# Patient Record
Sex: MALE | Race: WHITE | NOT HISPANIC OR LATINO | Employment: FULL TIME | URBAN - METROPOLITAN AREA
[De-identification: names, ages, dates, MRNs, and addresses within clinical notes are randomized per-mention and may not be internally consistent; named-entity substitution may affect disease eponyms.]

---

## 2020-02-27 ENCOUNTER — APPOINTMENT (OUTPATIENT)
Dept: CT IMAGING | Facility: HOSPITAL | Age: 45
End: 2020-02-27

## 2020-02-27 ENCOUNTER — HOSPITAL ENCOUNTER (INPATIENT)
Facility: HOSPITAL | Age: 45
LOS: 2 days | Discharge: HOME | End: 2020-02-29
Attending: EMERGENCY MEDICINE | Admitting: INTERNAL MEDICINE

## 2020-02-27 ENCOUNTER — APPOINTMENT (OUTPATIENT)
Dept: GENERAL RADIOLOGY | Facility: HOSPITAL | Age: 45
End: 2020-02-27

## 2020-02-27 DIAGNOSIS — J20.4 PARAINFLUENZA VIRUS BRONCHITIS: ICD-10-CM

## 2020-02-27 DIAGNOSIS — J96.01 ACUTE RESPIRATORY FAILURE WITH HYPOXIA (HCC): Primary | ICD-10-CM

## 2020-02-27 DIAGNOSIS — J47.1 BRONCHIECTASIS WITH ACUTE EXACERBATION (HCC): ICD-10-CM

## 2020-02-27 LAB
ALBUMIN SERPL-MCNC: 4.3 G/DL (ref 3.5–5.2)
ALBUMIN/GLOB SERPL: 2 G/DL
ALP SERPL-CCNC: 93 U/L (ref 39–117)
ALT SERPL W P-5'-P-CCNC: 29 U/L (ref 1–41)
ANION GAP SERPL CALCULATED.3IONS-SCNC: 14.4 MMOL/L (ref 5–15)
AST SERPL-CCNC: 26 U/L (ref 1–40)
B PARAPERT DNA SPEC QL NAA+PROBE: NOT DETECTED
B PERT DNA SPEC QL NAA+PROBE: NOT DETECTED
BASOPHILS # BLD AUTO: 0.07 10*3/MM3 (ref 0–0.2)
BASOPHILS NFR BLD AUTO: 0.4 % (ref 0–1.5)
BILIRUB SERPL-MCNC: 0.3 MG/DL (ref 0.2–1.2)
BUN BLD-MCNC: 18 MG/DL (ref 6–20)
BUN/CREAT SERPL: 18.6 (ref 7–25)
C PNEUM DNA NPH QL NAA+NON-PROBE: NOT DETECTED
CALCIUM SPEC-SCNC: 9.2 MG/DL (ref 8.6–10.5)
CHLORIDE SERPL-SCNC: 99 MMOL/L (ref 98–107)
CO2 SERPL-SCNC: 25.6 MMOL/L (ref 22–29)
CREAT BLD-MCNC: 0.97 MG/DL (ref 0.76–1.27)
DEPRECATED RDW RBC AUTO: 40.5 FL (ref 37–54)
EOSINOPHIL # BLD AUTO: 0.34 10*3/MM3 (ref 0–0.4)
EOSINOPHIL NFR BLD AUTO: 1.9 % (ref 0.3–6.2)
ERYTHROCYTE [DISTWIDTH] IN BLOOD BY AUTOMATED COUNT: 13.1 % (ref 12.3–15.4)
FLUAV H1 2009 PAND RNA NPH QL NAA+PROBE: NOT DETECTED
FLUAV H1 HA GENE NPH QL NAA+PROBE: NOT DETECTED
FLUAV H3 RNA NPH QL NAA+PROBE: NOT DETECTED
FLUAV SUBTYP SPEC NAA+PROBE: NOT DETECTED
FLUBV RNA ISLT QL NAA+PROBE: NOT DETECTED
GFR SERPL CREATININE-BSD FRML MDRD: 84 ML/MIN/1.73
GLOBULIN UR ELPH-MCNC: 2.2 GM/DL
GLUCOSE BLD-MCNC: 118 MG/DL (ref 65–99)
HADV DNA SPEC NAA+PROBE: NOT DETECTED
HCOV 229E RNA SPEC QL NAA+PROBE: NOT DETECTED
HCOV HKU1 RNA SPEC QL NAA+PROBE: NOT DETECTED
HCOV NL63 RNA SPEC QL NAA+PROBE: NOT DETECTED
HCOV OC43 RNA SPEC QL NAA+PROBE: NOT DETECTED
HCT VFR BLD AUTO: 49.2 % (ref 37.5–51)
HGB BLD-MCNC: 17 G/DL (ref 13–17.7)
HMPV RNA NPH QL NAA+NON-PROBE: DETECTED
HPIV1 RNA SPEC QL NAA+PROBE: NOT DETECTED
HPIV2 RNA SPEC QL NAA+PROBE: NOT DETECTED
HPIV3 RNA NPH QL NAA+PROBE: NOT DETECTED
HPIV4 P GENE NPH QL NAA+PROBE: DETECTED
IMM GRANULOCYTES # BLD AUTO: 0.2 10*3/MM3 (ref 0–0.05)
IMM GRANULOCYTES NFR BLD AUTO: 1.1 % (ref 0–0.5)
LYMPHOCYTES # BLD AUTO: 2.59 10*3/MM3 (ref 0.7–3.1)
LYMPHOCYTES NFR BLD AUTO: 14.4 % (ref 19.6–45.3)
M PNEUMO IGG SER IA-ACNC: NOT DETECTED
MCH RBC QN AUTO: 29.8 PG (ref 26.6–33)
MCHC RBC AUTO-ENTMCNC: 34.6 G/DL (ref 31.5–35.7)
MCV RBC AUTO: 86.3 FL (ref 79–97)
MONOCYTES # BLD AUTO: 0.9 10*3/MM3 (ref 0.1–0.9)
MONOCYTES NFR BLD AUTO: 5 % (ref 5–12)
NEUTROPHILS # BLD AUTO: 13.84 10*3/MM3 (ref 1.7–7)
NEUTROPHILS NFR BLD AUTO: 77.2 % (ref 42.7–76)
NRBC BLD AUTO-RTO: 0 /100 WBC (ref 0–0.2)
NT-PROBNP SERPL-MCNC: 11.1 PG/ML (ref 5–450)
PLATELET # BLD AUTO: 310 10*3/MM3 (ref 140–450)
PMV BLD AUTO: 9 FL (ref 6–12)
POTASSIUM BLD-SCNC: 3.7 MMOL/L (ref 3.5–5.2)
PROT SERPL-MCNC: 6.5 G/DL (ref 6–8.5)
RBC # BLD AUTO: 5.7 10*6/MM3 (ref 4.14–5.8)
RHINOVIRUS RNA SPEC NAA+PROBE: NOT DETECTED
RSV RNA NPH QL NAA+NON-PROBE: NOT DETECTED
SODIUM BLD-SCNC: 139 MMOL/L (ref 136–145)
TROPONIN T SERPL-MCNC: <0.01 NG/ML (ref 0–0.03)
WBC NRBC COR # BLD: 17.94 10*3/MM3 (ref 3.4–10.8)

## 2020-02-27 PROCEDURE — 94799 UNLISTED PULMONARY SVC/PX: CPT

## 2020-02-27 PROCEDURE — 84484 ASSAY OF TROPONIN QUANT: CPT | Performed by: EMERGENCY MEDICINE

## 2020-02-27 PROCEDURE — 83880 ASSAY OF NATRIURETIC PEPTIDE: CPT | Performed by: EMERGENCY MEDICINE

## 2020-02-27 PROCEDURE — 0100U HC BIOFIRE FILMARRAY RESP PANEL 2: CPT | Performed by: EMERGENCY MEDICINE

## 2020-02-27 PROCEDURE — 99285 EMERGENCY DEPT VISIT HI MDM: CPT

## 2020-02-27 PROCEDURE — 25010000002 METHYLPREDNISOLONE PER 125 MG: Performed by: EMERGENCY MEDICINE

## 2020-02-27 PROCEDURE — 99284 EMERGENCY DEPT VISIT MOD MDM: CPT

## 2020-02-27 PROCEDURE — 71046 X-RAY EXAM CHEST 2 VIEWS: CPT

## 2020-02-27 PROCEDURE — 85025 COMPLETE CBC W/AUTO DIFF WBC: CPT | Performed by: EMERGENCY MEDICINE

## 2020-02-27 PROCEDURE — 71275 CT ANGIOGRAPHY CHEST: CPT

## 2020-02-27 PROCEDURE — 80053 COMPREHEN METABOLIC PANEL: CPT | Performed by: EMERGENCY MEDICINE

## 2020-02-27 PROCEDURE — 94640 AIRWAY INHALATION TREATMENT: CPT

## 2020-02-27 PROCEDURE — 93010 ELECTROCARDIOGRAM REPORT: CPT | Performed by: INTERNAL MEDICINE

## 2020-02-27 PROCEDURE — 25010000002 IOPAMIDOL 61 % SOLUTION: Performed by: EMERGENCY MEDICINE

## 2020-02-27 PROCEDURE — 93005 ELECTROCARDIOGRAM TRACING: CPT | Performed by: EMERGENCY MEDICINE

## 2020-02-27 RX ORDER — FEXOFENADINE HCL AND PSEUDOEPHEDRINE HCI 180; 240 MG/1; MG/1
1 TABLET, EXTENDED RELEASE ORAL NIGHTLY
COMMUNITY
End: 2022-10-08 | Stop reason: HOSPADM

## 2020-02-27 RX ORDER — CETIRIZINE HYDROCHLORIDE 5 MG/1
5 TABLET ORAL DAILY
COMMUNITY

## 2020-02-27 RX ORDER — SODIUM CHLORIDE 0.9 % (FLUSH) 0.9 %
10 SYRINGE (ML) INJECTION AS NEEDED
Status: DISCONTINUED | OUTPATIENT
Start: 2020-02-27 | End: 2020-02-29 | Stop reason: HOSPADM

## 2020-02-27 RX ORDER — ALBUTEROL SULFATE 90 UG/1
2 AEROSOL, METERED RESPIRATORY (INHALATION) EVERY 4 HOURS PRN
COMMUNITY
End: 2022-10-08 | Stop reason: HOSPADM

## 2020-02-27 RX ORDER — METHYLPREDNISOLONE SODIUM SUCCINATE 125 MG/2ML
125 INJECTION, POWDER, LYOPHILIZED, FOR SOLUTION INTRAMUSCULAR; INTRAVENOUS ONCE
Status: COMPLETED | OUTPATIENT
Start: 2020-02-27 | End: 2020-02-27

## 2020-02-27 RX ORDER — IPRATROPIUM BROMIDE AND ALBUTEROL SULFATE 2.5; .5 MG/3ML; MG/3ML
3 SOLUTION RESPIRATORY (INHALATION) ONCE
Status: COMPLETED | OUTPATIENT
Start: 2020-02-27 | End: 2020-02-27

## 2020-02-27 RX ADMIN — IOPAMIDOL 95 ML: 612 INJECTION, SOLUTION INTRAVENOUS at 20:44

## 2020-02-27 RX ADMIN — METHYLPREDNISOLONE SODIUM SUCCINATE 125 MG: 125 INJECTION, POWDER, FOR SOLUTION INTRAMUSCULAR; INTRAVENOUS at 19:48

## 2020-02-27 RX ADMIN — IPRATROPIUM BROMIDE AND ALBUTEROL SULFATE 3 ML: 2.5; .5 SOLUTION RESPIRATORY (INHALATION) at 19:54

## 2020-02-28 LAB — PHOSPHATE SERPL-MCNC: 2 MG/DL (ref 2.5–4.5)

## 2020-02-28 PROCEDURE — 84100 ASSAY OF PHOSPHORUS: CPT | Performed by: INTERNAL MEDICINE

## 2020-02-28 PROCEDURE — 94799 UNLISTED PULMONARY SVC/PX: CPT

## 2020-02-28 PROCEDURE — 25010000002 ENOXAPARIN PER 10 MG: Performed by: INTERNAL MEDICINE

## 2020-02-28 PROCEDURE — 25010000002 METHYLPREDNISOLONE PER 40 MG: Performed by: INTERNAL MEDICINE

## 2020-02-28 RX ORDER — SODIUM CHLORIDE 0.9 % (FLUSH) 0.9 %
10 SYRINGE (ML) INJECTION EVERY 12 HOURS SCHEDULED
Status: DISCONTINUED | OUTPATIENT
Start: 2020-02-28 | End: 2020-02-29 | Stop reason: HOSPADM

## 2020-02-28 RX ORDER — PREDNISONE 20 MG/1
40 TABLET ORAL
Status: DISCONTINUED | OUTPATIENT
Start: 2020-02-29 | End: 2020-02-29 | Stop reason: HOSPADM

## 2020-02-28 RX ORDER — SODIUM CHLORIDE FOR INHALATION 7 %
4 VIAL, NEBULIZER (ML) INHALATION
Status: DISCONTINUED | OUTPATIENT
Start: 2020-02-28 | End: 2020-02-29

## 2020-02-28 RX ORDER — GUAIFENESIN 600 MG/1
600 TABLET, EXTENDED RELEASE ORAL EVERY 12 HOURS SCHEDULED
Status: DISCONTINUED | OUTPATIENT
Start: 2020-02-28 | End: 2020-02-29 | Stop reason: HOSPADM

## 2020-02-28 RX ORDER — BUDESONIDE 0.5 MG/2ML
0.5 INHALANT ORAL
Status: DISCONTINUED | OUTPATIENT
Start: 2020-02-28 | End: 2020-02-29

## 2020-02-28 RX ORDER — METHYLPREDNISOLONE SODIUM SUCCINATE 40 MG/ML
40 INJECTION, POWDER, LYOPHILIZED, FOR SOLUTION INTRAMUSCULAR; INTRAVENOUS EVERY 8 HOURS
Status: COMPLETED | OUTPATIENT
Start: 2020-02-28 | End: 2020-02-28

## 2020-02-28 RX ORDER — IPRATROPIUM BROMIDE AND ALBUTEROL SULFATE 2.5; .5 MG/3ML; MG/3ML
3 SOLUTION RESPIRATORY (INHALATION)
Status: DISCONTINUED | OUTPATIENT
Start: 2020-02-28 | End: 2020-02-29

## 2020-02-28 RX ORDER — ACETAMINOPHEN 325 MG/1
650 TABLET ORAL EVERY 6 HOURS PRN
Status: DISCONTINUED | OUTPATIENT
Start: 2020-02-28 | End: 2020-02-29 | Stop reason: HOSPADM

## 2020-02-28 RX ORDER — SODIUM CHLORIDE 0.9 % (FLUSH) 0.9 %
10 SYRINGE (ML) INJECTION AS NEEDED
Status: DISCONTINUED | OUTPATIENT
Start: 2020-02-28 | End: 2020-02-29 | Stop reason: HOSPADM

## 2020-02-28 RX ADMIN — GUAIFENESIN 600 MG: 600 TABLET, EXTENDED RELEASE ORAL at 20:46

## 2020-02-28 RX ADMIN — BUDESONIDE 0.5 MG: 0.5 INHALANT RESPIRATORY (INHALATION) at 08:19

## 2020-02-28 RX ADMIN — IPRATROPIUM BROMIDE AND ALBUTEROL SULFATE 3 ML: 2.5; .5 SOLUTION RESPIRATORY (INHALATION) at 20:16

## 2020-02-28 RX ADMIN — SODIUM CHLORIDE SOLN NEBU 7% 4 ML: 7 NEBU SOLN at 08:21

## 2020-02-28 RX ADMIN — ENOXAPARIN SODIUM 40 MG: 40 INJECTION SUBCUTANEOUS at 20:46

## 2020-02-28 RX ADMIN — GUAIFENESIN 600 MG: 600 TABLET, EXTENDED RELEASE ORAL at 09:21

## 2020-02-28 RX ADMIN — SODIUM CHLORIDE SOLN NEBU 7% 4 ML: 7 NEBU SOLN at 20:16

## 2020-02-28 RX ADMIN — SODIUM CHLORIDE, PRESERVATIVE FREE 10 ML: 5 INJECTION INTRAVENOUS at 20:46

## 2020-02-28 RX ADMIN — BUDESONIDE 0.5 MG: 0.5 INHALANT RESPIRATORY (INHALATION) at 20:16

## 2020-02-28 RX ADMIN — METHYLPREDNISOLONE SODIUM SUCCINATE 40 MG: 40 INJECTION, POWDER, FOR SOLUTION INTRAMUSCULAR; INTRAVENOUS at 19:29

## 2020-02-28 RX ADMIN — SODIUM CHLORIDE, PRESERVATIVE FREE 10 ML: 5 INJECTION INTRAVENOUS at 09:21

## 2020-02-28 RX ADMIN — METHYLPREDNISOLONE SODIUM SUCCINATE 40 MG: 40 INJECTION, POWDER, FOR SOLUTION INTRAMUSCULAR; INTRAVENOUS at 11:59

## 2020-02-28 RX ADMIN — IPRATROPIUM BROMIDE AND ALBUTEROL SULFATE 3 ML: 2.5; .5 SOLUTION RESPIRATORY (INHALATION) at 11:38

## 2020-02-28 RX ADMIN — IPRATROPIUM BROMIDE AND ALBUTEROL SULFATE 3 ML: 2.5; .5 SOLUTION RESPIRATORY (INHALATION) at 16:39

## 2020-02-28 RX ADMIN — METHYLPREDNISOLONE SODIUM SUCCINATE 40 MG: 40 INJECTION, POWDER, FOR SOLUTION INTRAMUSCULAR; INTRAVENOUS at 05:04

## 2020-02-28 RX ADMIN — IPRATROPIUM BROMIDE AND ALBUTEROL SULFATE 3 ML: 2.5; .5 SOLUTION RESPIRATORY (INHALATION) at 08:20

## 2020-02-29 VITALS
HEIGHT: 69 IN | WEIGHT: 199.8 LBS | TEMPERATURE: 97.1 F | HEART RATE: 91 BPM | SYSTOLIC BLOOD PRESSURE: 126 MMHG | OXYGEN SATURATION: 93 % | DIASTOLIC BLOOD PRESSURE: 78 MMHG | BODY MASS INDEX: 29.59 KG/M2 | RESPIRATION RATE: 18 BRPM

## 2020-02-29 PROBLEM — J20.4 PARAINFLUENZA VIRUS BRONCHITIS: Status: ACTIVE | Noted: 2020-02-29

## 2020-02-29 PROBLEM — J47.1 BRONCHIECTASIS WITH ACUTE EXACERBATION (HCC): Status: ACTIVE | Noted: 2020-02-29

## 2020-02-29 PROBLEM — J96.01 ACUTE RESPIRATORY FAILURE WITH HYPOXIA: Status: RESOLVED | Noted: 2020-02-27 | Resolved: 2020-02-29

## 2020-02-29 LAB
ANION GAP SERPL CALCULATED.3IONS-SCNC: 13.5 MMOL/L (ref 5–15)
BASOPHILS # BLD AUTO: 0.03 10*3/MM3 (ref 0–0.2)
BASOPHILS NFR BLD AUTO: 0.1 % (ref 0–1.5)
BUN BLD-MCNC: 17 MG/DL (ref 6–20)
BUN/CREAT SERPL: 20 (ref 7–25)
CALCIUM SPEC-SCNC: 8.7 MG/DL (ref 8.6–10.5)
CHLORIDE SERPL-SCNC: 103 MMOL/L (ref 98–107)
CO2 SERPL-SCNC: 20.5 MMOL/L (ref 22–29)
CREAT BLD-MCNC: 0.85 MG/DL (ref 0.76–1.27)
DEPRECATED RDW RBC AUTO: 42.7 FL (ref 37–54)
EOSINOPHIL # BLD AUTO: 0 10*3/MM3 (ref 0–0.4)
EOSINOPHIL NFR BLD AUTO: 0 % (ref 0.3–6.2)
ERYTHROCYTE [DISTWIDTH] IN BLOOD BY AUTOMATED COUNT: 13.1 % (ref 12.3–15.4)
GFR SERPL CREATININE-BSD FRML MDRD: 98 ML/MIN/1.73
GLUCOSE BLD-MCNC: 160 MG/DL (ref 65–99)
HCT VFR BLD AUTO: 45.8 % (ref 37.5–51)
HGB BLD-MCNC: 15.5 G/DL (ref 13–17.7)
IGA1 MFR SER: 139 MG/DL (ref 70–400)
IGG1 SER-MCNC: 655 MG/DL (ref 700–1600)
IGM SERPL-MCNC: 102 MG/DL (ref 40–230)
IMM GRANULOCYTES # BLD AUTO: 0.2 10*3/MM3 (ref 0–0.05)
IMM GRANULOCYTES NFR BLD AUTO: 0.9 % (ref 0–0.5)
LYMPHOCYTES # BLD AUTO: 0.58 10*3/MM3 (ref 0.7–3.1)
LYMPHOCYTES NFR BLD AUTO: 2.5 % (ref 19.6–45.3)
MCH RBC QN AUTO: 29.9 PG (ref 26.6–33)
MCHC RBC AUTO-ENTMCNC: 33.8 G/DL (ref 31.5–35.7)
MCV RBC AUTO: 88.2 FL (ref 79–97)
MONOCYTES # BLD AUTO: 1.11 10*3/MM3 (ref 0.1–0.9)
MONOCYTES NFR BLD AUTO: 4.8 % (ref 5–12)
NEUTROPHILS # BLD AUTO: 21.04 10*3/MM3 (ref 1.7–7)
NEUTROPHILS NFR BLD AUTO: 91.7 % (ref 42.7–76)
NRBC BLD AUTO-RTO: 0 /100 WBC (ref 0–0.2)
PLATELET # BLD AUTO: 328 10*3/MM3 (ref 140–450)
PMV BLD AUTO: 9.1 FL (ref 6–12)
POTASSIUM BLD-SCNC: 4.3 MMOL/L (ref 3.5–5.2)
RBC # BLD AUTO: 5.19 10*6/MM3 (ref 4.14–5.8)
SODIUM BLD-SCNC: 137 MMOL/L (ref 136–145)
WBC NRBC COR # BLD: 22.96 10*3/MM3 (ref 3.4–10.8)

## 2020-02-29 PROCEDURE — 85025 COMPLETE CBC W/AUTO DIFF WBC: CPT | Performed by: INTERNAL MEDICINE

## 2020-02-29 PROCEDURE — 94799 UNLISTED PULMONARY SVC/PX: CPT

## 2020-02-29 PROCEDURE — 82785 ASSAY OF IGE: CPT | Performed by: INTERNAL MEDICINE

## 2020-02-29 PROCEDURE — 80048 BASIC METABOLIC PNL TOTAL CA: CPT | Performed by: INTERNAL MEDICINE

## 2020-02-29 PROCEDURE — 63710000001 PREDNISONE PER 1 MG: Performed by: INTERNAL MEDICINE

## 2020-02-29 PROCEDURE — 82784 ASSAY IGA/IGD/IGG/IGM EACH: CPT | Performed by: INTERNAL MEDICINE

## 2020-02-29 PROCEDURE — 82787 IGG 1 2 3 OR 4 EACH: CPT | Performed by: INTERNAL MEDICINE

## 2020-02-29 RX ORDER — PREDNISONE 20 MG/1
40 TABLET ORAL
Qty: 8 TABLET | Refills: 0 | Status: SHIPPED | OUTPATIENT
Start: 2020-03-01 | End: 2020-03-05

## 2020-02-29 RX ORDER — GUAIFENESIN 600 MG/1
600 TABLET, EXTENDED RELEASE ORAL EVERY 12 HOURS SCHEDULED
Qty: 10 TABLET | Refills: 0 | Status: SHIPPED | OUTPATIENT
Start: 2020-02-29 | End: 2022-10-08 | Stop reason: HOSPADM

## 2020-02-29 RX ADMIN — GUAIFENESIN 600 MG: 600 TABLET, EXTENDED RELEASE ORAL at 08:54

## 2020-02-29 RX ADMIN — BUDESONIDE 0.5 MG: 0.5 INHALANT RESPIRATORY (INHALATION) at 07:18

## 2020-02-29 RX ADMIN — IPRATROPIUM BROMIDE AND ALBUTEROL SULFATE 3 ML: 2.5; .5 SOLUTION RESPIRATORY (INHALATION) at 07:16

## 2020-02-29 RX ADMIN — IPRATROPIUM BROMIDE AND ALBUTEROL SULFATE 3 ML: 2.5; .5 SOLUTION RESPIRATORY (INHALATION) at 11:24

## 2020-02-29 RX ADMIN — PREDNISONE 40 MG: 20 TABLET ORAL at 08:54

## 2020-02-29 RX ADMIN — SODIUM CHLORIDE, PRESERVATIVE FREE 10 ML: 5 INJECTION INTRAVENOUS at 08:54

## 2020-03-02 LAB
IGG SERPL-MCNC: 599 MG/DL (ref 700–1600)
IGG1 SER-MCNC: 281 MG/DL (ref 248–810)
IGG2 SER-MCNC: 235 MG/DL (ref 130–555)
IGG3 SER-MCNC: 36 MG/DL (ref 15–102)
IGG4 SER-MCNC: 45 MG/DL (ref 2–96)

## 2020-03-04 LAB — TOTAL IGE SMQN RAST: 82 IU/ML (ref 6–495)

## 2022-10-04 PROCEDURE — 93005 ELECTROCARDIOGRAM TRACING: CPT | Performed by: EMERGENCY MEDICINE

## 2022-10-04 PROCEDURE — 93010 ELECTROCARDIOGRAM REPORT: CPT | Performed by: INTERNAL MEDICINE

## 2022-10-04 PROCEDURE — 99285 EMERGENCY DEPT VISIT HI MDM: CPT

## 2022-10-04 PROCEDURE — 93005 ELECTROCARDIOGRAM TRACING: CPT

## 2022-10-05 ENCOUNTER — HOSPITAL ENCOUNTER (INPATIENT)
Facility: HOSPITAL | Age: 47
LOS: 3 days | Discharge: HOME | End: 2022-10-08
Attending: EMERGENCY MEDICINE | Admitting: HOSPITALIST

## 2022-10-05 ENCOUNTER — APPOINTMENT (OUTPATIENT)
Dept: GENERAL RADIOLOGY | Facility: HOSPITAL | Age: 47
End: 2022-10-05

## 2022-10-05 DIAGNOSIS — R09.02 HYPOXIA: ICD-10-CM

## 2022-10-05 DIAGNOSIS — J45.41 MODERATE PERSISTENT ASTHMA WITH EXACERBATION: ICD-10-CM

## 2022-10-05 DIAGNOSIS — U07.1 COVID-19 VIRUS INFECTION: Primary | ICD-10-CM

## 2022-10-05 PROBLEM — J30.2 SEASONAL ALLERGIES: Status: ACTIVE | Noted: 2022-10-05

## 2022-10-05 PROBLEM — J45.909 ASTHMA: Status: ACTIVE | Noted: 2020-02-04

## 2022-10-05 PROBLEM — G47.33 OSA (OBSTRUCTIVE SLEEP APNEA): Chronic | Status: ACTIVE | Noted: 2022-10-05

## 2022-10-05 PROBLEM — J45.909 ASTHMA: Chronic | Status: ACTIVE | Noted: 2020-02-04

## 2022-10-05 LAB
ALBUMIN SERPL-MCNC: 4.5 G/DL (ref 3.5–5.2)
ALBUMIN/GLOB SERPL: 1.9 G/DL
ALP SERPL-CCNC: 98 U/L (ref 39–117)
ALT SERPL W P-5'-P-CCNC: 29 U/L (ref 1–41)
ANION GAP SERPL CALCULATED.3IONS-SCNC: 10.2 MMOL/L (ref 5–15)
AST SERPL-CCNC: 28 U/L (ref 1–40)
B PARAPERT DNA SPEC QL NAA+PROBE: NOT DETECTED
B PERT DNA SPEC QL NAA+PROBE: NOT DETECTED
BASOPHILS # BLD AUTO: 0.15 10*3/MM3 (ref 0–0.2)
BASOPHILS NFR BLD AUTO: 1.2 % (ref 0–1.5)
BILIRUB SERPL-MCNC: 0.3 MG/DL (ref 0–1.2)
BUN SERPL-MCNC: 11 MG/DL (ref 6–20)
BUN/CREAT SERPL: 10.7 (ref 7–25)
C PNEUM DNA NPH QL NAA+NON-PROBE: NOT DETECTED
CALCIUM SPEC-SCNC: 9.5 MG/DL (ref 8.6–10.5)
CHLORIDE SERPL-SCNC: 107 MMOL/L (ref 98–107)
CO2 SERPL-SCNC: 26.8 MMOL/L (ref 22–29)
CREAT SERPL-MCNC: 1.03 MG/DL (ref 0.76–1.27)
CRP SERPL-MCNC: 0.47 MG/DL (ref 0–0.5)
D DIMER PPP FEU-MCNC: <0.27 MCGFEU/ML (ref 0–0.49)
DEPRECATED RDW RBC AUTO: 45.2 FL (ref 37–54)
EGFRCR SERPLBLD CKD-EPI 2021: 91.3 ML/MIN/1.73
EOSINOPHIL # BLD AUTO: 1.94 10*3/MM3 (ref 0–0.4)
EOSINOPHIL NFR BLD AUTO: 15.3 % (ref 0.3–6.2)
ERYTHROCYTE [DISTWIDTH] IN BLOOD BY AUTOMATED COUNT: 14 % (ref 12.3–15.4)
FLUAV SUBTYP SPEC NAA+PROBE: NOT DETECTED
FLUBV RNA ISLT QL NAA+PROBE: NOT DETECTED
GLOBULIN UR ELPH-MCNC: 2.4 GM/DL
GLUCOSE SERPL-MCNC: 98 MG/DL (ref 65–99)
HADV DNA SPEC NAA+PROBE: NOT DETECTED
HCOV 229E RNA SPEC QL NAA+PROBE: NOT DETECTED
HCOV HKU1 RNA SPEC QL NAA+PROBE: NOT DETECTED
HCOV NL63 RNA SPEC QL NAA+PROBE: NOT DETECTED
HCOV OC43 RNA SPEC QL NAA+PROBE: NOT DETECTED
HCT VFR BLD AUTO: 50.7 % (ref 37.5–51)
HGB BLD-MCNC: 16.3 G/DL (ref 13–17.7)
HMPV RNA NPH QL NAA+NON-PROBE: NOT DETECTED
HPIV1 RNA ISLT QL NAA+PROBE: NOT DETECTED
HPIV2 RNA SPEC QL NAA+PROBE: NOT DETECTED
HPIV3 RNA NPH QL NAA+PROBE: NOT DETECTED
HPIV4 P GENE NPH QL NAA+PROBE: NOT DETECTED
IMM GRANULOCYTES # BLD AUTO: 0.05 10*3/MM3 (ref 0–0.05)
IMM GRANULOCYTES NFR BLD AUTO: 0.4 % (ref 0–0.5)
LYMPHOCYTES # BLD AUTO: 3.49 10*3/MM3 (ref 0.7–3.1)
LYMPHOCYTES NFR BLD AUTO: 27.5 % (ref 19.6–45.3)
M PNEUMO IGG SER IA-ACNC: NOT DETECTED
MAGNESIUM SERPL-MCNC: 2.4 MG/DL (ref 1.6–2.6)
MCH RBC QN AUTO: 28.4 PG (ref 26.6–33)
MCHC RBC AUTO-ENTMCNC: 32.1 G/DL (ref 31.5–35.7)
MCV RBC AUTO: 88.3 FL (ref 79–97)
MONOCYTES # BLD AUTO: 1.11 10*3/MM3 (ref 0.1–0.9)
MONOCYTES NFR BLD AUTO: 8.8 % (ref 5–12)
NEUTROPHILS NFR BLD AUTO: 46.8 % (ref 42.7–76)
NEUTROPHILS NFR BLD AUTO: 5.93 10*3/MM3 (ref 1.7–7)
NRBC BLD AUTO-RTO: 0 /100 WBC (ref 0–0.2)
NT-PROBNP SERPL-MCNC: 22 PG/ML (ref 0–450)
PLATELET # BLD AUTO: 322 10*3/MM3 (ref 140–450)
PMV BLD AUTO: 9 FL (ref 6–12)
POTASSIUM SERPL-SCNC: 4.4 MMOL/L (ref 3.5–5.2)
PROCALCITONIN SERPL-MCNC: 0.04 NG/ML (ref 0–0.25)
PROT SERPL-MCNC: 6.9 G/DL (ref 6–8.5)
QT INTERVAL: 347 MS
QT INTERVAL: 368 MS
RBC # BLD AUTO: 5.74 10*6/MM3 (ref 4.14–5.8)
RHINOVIRUS RNA SPEC NAA+PROBE: NOT DETECTED
RSV RNA NPH QL NAA+NON-PROBE: NOT DETECTED
SARS-COV-2 RNA NPH QL NAA+NON-PROBE: DETECTED
SODIUM SERPL-SCNC: 144 MMOL/L (ref 136–145)
TROPONIN T SERPL-MCNC: <0.01 NG/ML (ref 0–0.03)
WBC NRBC COR # BLD: 12.67 10*3/MM3 (ref 3.4–10.8)

## 2022-10-05 PROCEDURE — 25010000002 METHYLPREDNISOLONE PER 125 MG: Performed by: PHYSICIAN ASSISTANT

## 2022-10-05 PROCEDURE — 85025 COMPLETE CBC W/AUTO DIFF WBC: CPT | Performed by: EMERGENCY MEDICINE

## 2022-10-05 PROCEDURE — 93010 ELECTROCARDIOGRAM REPORT: CPT | Performed by: INTERNAL MEDICINE

## 2022-10-05 PROCEDURE — 85379 FIBRIN DEGRADATION QUANT: CPT | Performed by: NURSE PRACTITIONER

## 2022-10-05 PROCEDURE — G0378 HOSPITAL OBSERVATION PER HR: HCPCS

## 2022-10-05 PROCEDURE — 94799 UNLISTED PULMONARY SVC/PX: CPT

## 2022-10-05 PROCEDURE — 71045 X-RAY EXAM CHEST 1 VIEW: CPT

## 2022-10-05 PROCEDURE — 0202U NFCT DS 22 TRGT SARS-COV-2: CPT | Performed by: PHYSICIAN ASSISTANT

## 2022-10-05 PROCEDURE — 94640 AIRWAY INHALATION TREATMENT: CPT

## 2022-10-05 PROCEDURE — 94761 N-INVAS EAR/PLS OXIMETRY MLT: CPT

## 2022-10-05 PROCEDURE — 25010000002 METHYLPREDNISOLONE PER 125 MG: Performed by: NURSE PRACTITIONER

## 2022-10-05 PROCEDURE — 80053 COMPREHEN METABOLIC PANEL: CPT | Performed by: EMERGENCY MEDICINE

## 2022-10-05 PROCEDURE — 94760 N-INVAS EAR/PLS OXIMETRY 1: CPT

## 2022-10-05 PROCEDURE — 25010000002 ENOXAPARIN PER 10 MG: Performed by: NURSE PRACTITIONER

## 2022-10-05 PROCEDURE — 84145 PROCALCITONIN (PCT): CPT | Performed by: EMERGENCY MEDICINE

## 2022-10-05 PROCEDURE — 84484 ASSAY OF TROPONIN QUANT: CPT | Performed by: EMERGENCY MEDICINE

## 2022-10-05 PROCEDURE — 86140 C-REACTIVE PROTEIN: CPT | Performed by: NURSE PRACTITIONER

## 2022-10-05 PROCEDURE — 83880 ASSAY OF NATRIURETIC PEPTIDE: CPT | Performed by: EMERGENCY MEDICINE

## 2022-10-05 PROCEDURE — 83735 ASSAY OF MAGNESIUM: CPT | Performed by: EMERGENCY MEDICINE

## 2022-10-05 RX ORDER — ALBUTEROL SULFATE 2.5 MG/3ML
2.5 SOLUTION RESPIRATORY (INHALATION) ONCE
Status: COMPLETED | OUTPATIENT
Start: 2022-10-05 | End: 2022-10-05

## 2022-10-05 RX ORDER — SODIUM CHLORIDE 0.9 % (FLUSH) 0.9 %
10 SYRINGE (ML) INJECTION AS NEEDED
Status: DISCONTINUED | OUTPATIENT
Start: 2022-10-05 | End: 2022-10-08 | Stop reason: HOSPADM

## 2022-10-05 RX ORDER — METHYLPREDNISOLONE SODIUM SUCCINATE 125 MG/2ML
125 INJECTION, POWDER, LYOPHILIZED, FOR SOLUTION INTRAMUSCULAR; INTRAVENOUS ONCE
Status: COMPLETED | OUTPATIENT
Start: 2022-10-05 | End: 2022-10-05

## 2022-10-05 RX ORDER — SODIUM CHLORIDE 0.9 % (FLUSH) 0.9 %
10 SYRINGE (ML) INJECTION EVERY 12 HOURS SCHEDULED
Status: DISCONTINUED | OUTPATIENT
Start: 2022-10-05 | End: 2022-10-08 | Stop reason: HOSPADM

## 2022-10-05 RX ORDER — ONDANSETRON 2 MG/ML
4 INJECTION INTRAMUSCULAR; INTRAVENOUS EVERY 6 HOURS PRN
Status: DISCONTINUED | OUTPATIENT
Start: 2022-10-05 | End: 2022-10-08 | Stop reason: HOSPADM

## 2022-10-05 RX ORDER — LORATADINE 10 MG/1
10 TABLET ORAL NIGHTLY
COMMUNITY
End: 2022-10-08 | Stop reason: HOSPADM

## 2022-10-05 RX ORDER — IPRATROPIUM BROMIDE AND ALBUTEROL SULFATE 2.5; .5 MG/3ML; MG/3ML
3 SOLUTION RESPIRATORY (INHALATION) ONCE
Status: COMPLETED | OUTPATIENT
Start: 2022-10-05 | End: 2022-10-05

## 2022-10-05 RX ORDER — ALBUTEROL SULFATE 2.5 MG/3ML
2.5 SOLUTION RESPIRATORY (INHALATION)
Status: DISCONTINUED | OUTPATIENT
Start: 2022-10-05 | End: 2022-10-06

## 2022-10-05 RX ORDER — METHYLPREDNISOLONE SODIUM SUCCINATE 125 MG/2ML
60 INJECTION, POWDER, LYOPHILIZED, FOR SOLUTION INTRAMUSCULAR; INTRAVENOUS EVERY 12 HOURS
Status: DISCONTINUED | OUTPATIENT
Start: 2022-10-05 | End: 2022-10-06

## 2022-10-05 RX ORDER — CETIRIZINE HYDROCHLORIDE 10 MG/1
5 TABLET ORAL DAILY
Status: DISCONTINUED | OUTPATIENT
Start: 2022-10-05 | End: 2022-10-08 | Stop reason: HOSPADM

## 2022-10-05 RX ORDER — ACETAMINOPHEN 325 MG/1
650 TABLET ORAL EVERY 4 HOURS PRN
Status: DISCONTINUED | OUTPATIENT
Start: 2022-10-05 | End: 2022-10-08 | Stop reason: HOSPADM

## 2022-10-05 RX ORDER — ONDANSETRON 4 MG/1
4 TABLET, FILM COATED ORAL EVERY 6 HOURS PRN
Status: DISCONTINUED | OUTPATIENT
Start: 2022-10-05 | End: 2022-10-08 | Stop reason: HOSPADM

## 2022-10-05 RX ORDER — ENOXAPARIN SODIUM 100 MG/ML
40 INJECTION SUBCUTANEOUS DAILY
Status: DISCONTINUED | OUTPATIENT
Start: 2022-10-05 | End: 2022-10-08 | Stop reason: HOSPADM

## 2022-10-05 RX ORDER — ACETAMINOPHEN 650 MG/1
650 SUPPOSITORY RECTAL EVERY 4 HOURS PRN
Status: DISCONTINUED | OUTPATIENT
Start: 2022-10-05 | End: 2022-10-08 | Stop reason: HOSPADM

## 2022-10-05 RX ORDER — IPRATROPIUM BROMIDE AND ALBUTEROL SULFATE 2.5; .5 MG/3ML; MG/3ML
3 SOLUTION RESPIRATORY (INHALATION) EVERY 4 HOURS PRN
COMMUNITY
Start: 2022-10-04

## 2022-10-05 RX ORDER — ACETAMINOPHEN 160 MG/5ML
650 SOLUTION ORAL EVERY 4 HOURS PRN
Status: DISCONTINUED | OUTPATIENT
Start: 2022-10-05 | End: 2022-10-08 | Stop reason: HOSPADM

## 2022-10-05 RX ORDER — CALCIUM CARBONATE 200(500)MG
2 TABLET,CHEWABLE ORAL 2 TIMES DAILY PRN
Status: DISCONTINUED | OUTPATIENT
Start: 2022-10-05 | End: 2022-10-08 | Stop reason: HOSPADM

## 2022-10-05 RX ADMIN — ALBUTEROL SULFATE 2.5 MG: 2.5 SOLUTION RESPIRATORY (INHALATION) at 04:28

## 2022-10-05 RX ADMIN — Medication 10 ML: at 12:18

## 2022-10-05 RX ADMIN — METHYLPREDNISOLONE SODIUM SUCCINATE 125 MG: 125 INJECTION, POWDER, FOR SOLUTION INTRAMUSCULAR; INTRAVENOUS at 03:12

## 2022-10-05 RX ADMIN — ENOXAPARIN SODIUM 40 MG: 100 INJECTION SUBCUTANEOUS at 12:17

## 2022-10-05 RX ADMIN — METHYLPREDNISOLONE SODIUM SUCCINATE 60 MG: 125 INJECTION, POWDER, FOR SOLUTION INTRAMUSCULAR; INTRAVENOUS at 15:01

## 2022-10-05 RX ADMIN — IPRATROPIUM BROMIDE AND ALBUTEROL SULFATE 3 ML: .5; 3 SOLUTION RESPIRATORY (INHALATION) at 02:49

## 2022-10-05 RX ADMIN — ALBUTEROL SULFATE 2.5 MG: 2.5 SOLUTION RESPIRATORY (INHALATION) at 19:18

## 2022-10-05 RX ADMIN — ALBUTEROL SULFATE 2.5 MG: 2.5 SOLUTION RESPIRATORY (INHALATION) at 23:02

## 2022-10-05 RX ADMIN — ALBUTEROL SULFATE 2.5 MG: 2.5 SOLUTION RESPIRATORY (INHALATION) at 15:23

## 2022-10-05 RX ADMIN — CETIRIZINE HYDROCHLORIDE 5 MG: 10 TABLET ORAL at 15:05

## 2022-10-05 NOTE — H&P
[facility]      Patient Name: Vishal Hair  : 1975  MRN: [id number]  Primary Care Physician:  System, Provider Not In  Date of admission: 10/5/2022      Subjective      Chief Complaint: Shortness of Breath     History of Present Illness: Vishal Hair is a 47 y.o. male w/PMH of asthma  who presented to [facility]  on 10/5/2022 complaining of two week history cough, congestion and worsening shortness of breath. Patient reports that he has asthma and thought he was having a worse than normal flair after recently mowing several lawns. He states he went to the urgent care yesterday for evaluation, and to ask for oral steroids because they have been very helpful in the past. His oxygen saturation was reported to be 84% so he was given a breathing treatment and advised to report to the emergency department for further treatment. Patient also reports recently being started on CPAP for GENTRY. He denies any other acute distress, chest pain, palpitations, syncope, lightheadedness, headache, abdominal pain, nausea, vomiting, diarrhea, constipation or  symptoms.       Upon arrival to the emergency department patient was found to be hypoxic with oxygent saturation of 84%. He was administered breathing treatments and solumedrol, with reported improvement. Labs are unremarkable with exception of slightly elevated white count of 12.63, 15.3% eosinophils, Respiratory PCR is positive for Covid 19.   I have reviewed Chest X Ray- interpreted as no acute cardiopulmonary processes.   EKG reviewed and interpreted as normal sinus rhythm heart rate 94 with no ectopy noted.      Patient will be admitted for further observation and treatment of COVID-19 and other acute and or chronic conditions as needed.      Review of Systems   HENT: Positive for congestion and sore throat.    Eyes: Negative.    Cardiovascular: Negative.    Respiratory: Positive for cough, shortness of breath and  sputum production.    Endocrine: Negative.    Hematologic/Lymphatic: Negative.    Skin: Negative.    Musculoskeletal: Negative.    Genitourinary: Negative.    Neurological: Negative.    Psychiatric/Behavioral: Negative.    Allergic/Immunologic: Positive for environmental allergies.   All other systems reviewed and are negative.       Personal History     Past Medical History:   Diagnosis Date   • Asthma    • Hypoxia 10/5/2022       History reviewed. No pertinent surgical history.    Family History: family history is not on file.   Social History:  reports that he has quit smoking. His smoking use included cigarettes. He started smoking about 2 years ago. He has a 3.75 pack-year smoking history. He has never used smokeless tobacco. He reports that he does not drink alcohol.    Home Medications:  Prior to Admission Medications     Prescriptions Last Dose Informant Patient Reported? Taking?    albuterol sulfate  (90 Base) MCG/ACT inhaler 10/4/2022  Yes Yes    Inhale 2 puffs Every 4 (Four) Hours As Needed for Wheezing or Shortness of Air.    cetirizine (zyrTEC) 5 MG tablet 10/4/2022  Yes Yes    Take 5 mg by mouth Daily.    fexofenadine-pseudoephedrine (ALLEGRA-D 24) 180-240 MG per 24 hr tablet 10/4/2022  Yes Yes    Take 1 tablet by mouth Every Night.    guaiFENesin (MUCINEX) 600 MG 12 hr tablet 10/4/2022  No Yes    Take 1 tablet by mouth Every 12 (Twelve) Hours.    ipratropium-albuterol (DUO-NEB) 0.5-2.5 mg/3 ml nebulizer 10/4/2022  Yes Yes    Inhale 3 mL Every 4 (Four) Hours As Needed.    loratadine (CLARITIN) 10 MG tablet 10/4/2022  Yes Yes    Take 10 mg by mouth Every Night.            Allergies:  No Known Allergies    Objective      Vitals:   Temp:  [97.4 °F (36.3 °C)-97.9 °F (36.6 °C)] 97.9 °F (36.6 °C)  Heart Rate:  [] 99  Resp:  [18-24] 22  BP: (121-162)/(70-96) 136/96  Flow (L/min):  [0-2] 0    Physical Exam  Vitals and nursing note reviewed.   Constitutional:       Appearance: Normal appearance.    HENT:      Nose: Congestion present.      Mouth/Throat:      Mouth: Mucous membranes are dry.   Cardiovascular:      Rate and Rhythm: Normal rate and regular rhythm.      Pulses: Normal pulses.      Heart sounds: Normal heart sounds.   Pulmonary:      Effort: Pulmonary effort is normal.      Breath sounds: Examination of the right-lower field reveals decreased breath sounds. Examination of the left-lower field reveals decreased breath sounds. Decreased breath sounds present.   Abdominal:      General: Bowel sounds are normal.      Palpations: Abdomen is soft.   Skin:     General: Skin is warm and dry.      Capillary Refill: Capillary refill takes less than 2 seconds.   Neurological:      General: No focal deficit present.      Mental Status: He is alert and oriented to person, place, and time.   Psychiatric:         Mood and Affect: Mood normal.          Result Review    Result Review:  I have personally reviewed the results from the time of this admission to 10/5/2022 12:08 EDT and agree with these findings:  [x]  Laboratory  [x]  Microbiology  [x]  Radiology  [x]  EKG/Telemetry   []  Cardiology/Vascular   []  Pathology  []  Old records  []  Other:  Most notable findings include:       Assessment & Plan        Active Hospital Problems:  Active Hospital Problems    Diagnosis    • **COVID-19 virus infection    • Hypoxia      Plan:   COVID-19 virus infection  PCR confirmed  Continue Enhanced droplet isolation   Continue IV solu medrol   Disease surveillance and progression monitoring labs ordered  Symptom management medications ordered  Encourage pulmonary hygiene-TCDB-IS  Continuous cardiac monitoring and pulse oximetry  Supplemental oxygen as needed to maintain oxygen saturation greater than 90%  As needed duo nebs ordered for shortness or breath/ Wheezing     Hypoxia  Secondary to Covid 19   Treatment as above     Obstructive sleep apnea   On C-pap reports compliance   Family to bring home machine to bedside    Continuous pulse oximetry      Asthma  Chronic normally well controlled   Albuterol rescue inhaler at home will hold for now Duo nebs ordered  Likely in exacerbation secondary to Covid 19 infection     Seasonal Allergies   Chronic   Continue home Zyrtec with formulary substitute       DVT prophylaxis:  Medical and mechanical DVT prophylaxis orders are present.    CODE STATUS:    Code Status (Patient has no pulse and is not breathing): CPR (Attempt to Resuscitate)  Medical Interventions (Patient has pulse or is breathing): Full Support    Admission Status:  I believe this patient meets  observation status.    I discussed the patient's findings and my recommendations with patient.    This patient has been examined wearing appropriate Personal Protective Equipment     Signature: Electronically signed by CAROLINA Borjas, 10/05/22, 8:28 AM EDT.

## 2022-10-05 NOTE — ED PROVIDER NOTES
EMERGENCY DEPARTMENT ENCOUNTER    Room Number:  03/03  Date of encounter:  10/5/2022  PCP: System, Provider Not In  Historian: Patient      HPI:  Chief Complaint: Shortness of breath  A complete HPI/ROS/PMH/PSH/SH/FH are unobtainable due to: N/A    Context: Vishal Hair is a 47 y.o. male with past medical history of asthma and allergies who presents to the ED c/o upper respiratory infection.  Patient states that he started to get sick approximately 2 weeks ago with cough, runny nose, sneezing, wheezing, and shortness of breath.  Symptoms have gotten progressively worse until today when he felt increasingly short of breath.  Denies any chest pain, fever, chills, headaches or body aches.      PAST MEDICAL HISTORY  Active Ambulatory Problems     Diagnosis Date Noted   • Bronchiectasis with acute exacerbation (HCC) 02/29/2020   • Parainfluenza virus bronchitis 02/29/2020     Resolved Ambulatory Problems     Diagnosis Date Noted   • Acute respiratory failure with hypoxia (HCC) 02/27/2020     No Additional Past Medical History         PAST SURGICAL HISTORY  No past surgical history on file.      FAMILY HISTORY  No family history on file.      SOCIAL HISTORY  Social History     Socioeconomic History   • Marital status:    Tobacco Use   • Smoking status: Current Every Day Smoker     Packs/day: 0.25     Years: 15.00     Pack years: 3.75     Types: Cigarettes     Start date: 1/1/2020   • Smokeless tobacco: Never Used         ALLERGIES  Patient has no known allergies.        REVIEW OF SYSTEMS  Review of Systems     All systems reviewed and negative except for those discussed in HPI.       PHYSICAL EXAM    I have reviewed the triage vital signs and nursing notes.    ED Triage Vitals [10/04/22 2345]   Temp Heart Rate Resp BP SpO2   97.4 °F (36.3 °C) 98 20 162/83 94 %      Temp src Heart Rate Source Patient Position BP Location FiO2 (%)   -- -- -- -- --       Physical Exam  GENERAL: Alert and oriented x3, mildly  distressed  HENT: no pharyngeal erythema or tonsillar exudate  EYES: no scleral icterus, PERRL, EOMI  CV: regular rhythm, regular rate  RESPIRATORY: bilateral expiratory wheezes and prolonged expirations, and decreased air movement  ABDOMEN: soft  MUSCULOSKELETAL: no deformity  NEURO: alert, moves all extremities, follows commands  SKIN: warm, dry        LAB RESULTS  Recent Results (from the past 24 hour(s))   ECG 12 Lead    Collection Time: 10/04/22 11:56 PM   Result Value Ref Range    QT Interval 368 ms   Comprehensive Metabolic Panel    Collection Time: 10/05/22  1:22 AM    Specimen: Arm, Left; Blood   Result Value Ref Range    Glucose 98 65 - 99 mg/dL    BUN 11 6 - 20 mg/dL    Creatinine 1.03 0.76 - 1.27 mg/dL    Sodium 144 136 - 145 mmol/L    Potassium 4.4 3.5 - 5.2 mmol/L    Chloride 107 98 - 107 mmol/L    CO2 26.8 22.0 - 29.0 mmol/L    Calcium 9.5 8.6 - 10.5 mg/dL    Total Protein 6.9 6.0 - 8.5 g/dL    Albumin 4.50 3.50 - 5.20 g/dL    ALT (SGPT) 29 1 - 41 U/L    AST (SGOT) 28 1 - 40 U/L    Alkaline Phosphatase 98 39 - 117 U/L    Total Bilirubin 0.3 0.0 - 1.2 mg/dL    Globulin 2.4 gm/dL    A/G Ratio 1.9 g/dL    BUN/Creatinine Ratio 10.7 7.0 - 25.0    Anion Gap 10.2 5.0 - 15.0 mmol/L    eGFR 91.3 >60.0 mL/min/1.73   Troponin    Collection Time: 10/05/22  1:22 AM    Specimen: Arm, Left; Blood   Result Value Ref Range    Troponin T <0.010 0.000 - 0.030 ng/mL   BNP    Collection Time: 10/05/22  1:22 AM    Specimen: Arm, Left; Blood   Result Value Ref Range    proBNP 22.0 0.0 - 450.0 pg/mL   Magnesium    Collection Time: 10/05/22  1:22 AM    Specimen: Arm, Left; Blood   Result Value Ref Range    Magnesium 2.4 1.6 - 2.6 mg/dL   Procalcitonin    Collection Time: 10/05/22  1:22 AM    Specimen: Arm, Left; Blood   Result Value Ref Range    Procalcitonin 0.04 0.00 - 0.25 ng/mL   CBC Auto Differential    Collection Time: 10/05/22  1:22 AM    Specimen: Arm, Left; Blood   Result Value Ref Range    WBC 12.67 (H) 3.40 - 10.80  10*3/mm3    RBC 5.74 4.14 - 5.80 10*6/mm3    Hemoglobin 16.3 13.0 - 17.7 g/dL    Hematocrit 50.7 37.5 - 51.0 %    MCV 88.3 79.0 - 97.0 fL    MCH 28.4 26.6 - 33.0 pg    MCHC 32.1 31.5 - 35.7 g/dL    RDW 14.0 12.3 - 15.4 %    RDW-SD 45.2 37.0 - 54.0 fl    MPV 9.0 6.0 - 12.0 fL    Platelets 322 140 - 450 10*3/mm3    Neutrophil % 46.8 42.7 - 76.0 %    Lymphocyte % 27.5 19.6 - 45.3 %    Monocyte % 8.8 5.0 - 12.0 %    Eosinophil % 15.3 (H) 0.3 - 6.2 %    Basophil % 1.2 0.0 - 1.5 %    Immature Grans % 0.4 0.0 - 0.5 %    Neutrophils, Absolute 5.93 1.70 - 7.00 10*3/mm3    Lymphocytes, Absolute 3.49 (H) 0.70 - 3.10 10*3/mm3    Monocytes, Absolute 1.11 (H) 0.10 - 0.90 10*3/mm3    Eosinophils, Absolute 1.94 (H) 0.00 - 0.40 10*3/mm3    Basophils, Absolute 0.15 0.00 - 0.20 10*3/mm3    Immature Grans, Absolute 0.05 0.00 - 0.05 10*3/mm3    nRBC 0.0 0.0 - 0.2 /100 WBC   Respiratory Panel PCR w/COVID-19(SARS-CoV-2) SUN/LINDA/[facility] In-House, NP Swab in UTM/VTM, 3-4 HR TAT - Swab, Nasopharynx    Collection Time: 10/05/22  2:41 AM    Specimen: Nasopharynx; Swab   Result Value Ref Range    ADENOVIRUS, PCR Not Detected Not Detected    Coronavirus 229E Not Detected Not Detected    Coronavirus HKU1 Not Detected Not Detected    Coronavirus NL63 Not Detected Not Detected    Coronavirus OC43 Not Detected Not Detected    COVID19 Detected (C) Not Detected - Ref. Range    Human Metapneumovirus Not Detected Not Detected    Human Rhinovirus/Enterovirus Not Detected Not Detected    Influenza A PCR Not Detected Not Detected    Influenza B PCR Not Detected Not Detected    Parainfluenza Virus 1 Not Detected Not Detected    Parainfluenza Virus 2 Not Detected Not Detected    Parainfluenza Virus 3 Not Detected Not Detected    Parainfluenza Virus 4 Not Detected Not Detected    RSV, PCR Not Detected Not Detected    Bordetella pertussis pcr Not Detected Not Detected    Bordetella parapertussis PCR Not Detected Not Detected    Chlamydophila  pneumoniae PCR Not Detected Not Detected    Mycoplasma pneumo by PCR Not Detected Not Detected       Ordered the above labs and independently reviewed the results.        RADIOLOGY  XR Chest 1 View    Result Date: 10/5/2022  SINGLE VIEW OF THE CHEST  HISTORY: Shortness of air  COMPARISON: 02/27/2020  FINDINGS: Heart size is within normal limits. No pneumothorax, pleural effusion, or acute infiltrate is seen. There is an old right-sided rib fracture.      No acute findings.  This report was finalized on 10/5/2022 12:52 AM by Dr. Griselda Edmonds M.D.        I ordered the above noted radiological studies. Reviewed by me and discussed with radiologist.  See dictation for official radiology interpretation.      PROCEDURES    Procedures      MEDICATIONS GIVEN IN ER    Medications   methylPREDNISolone sodium succinate (SOLU-Medrol) injection 125 mg (125 mg Intravenous Given 10/5/22 0312)   ipratropium-albuterol (DUO-NEB) nebulizer solution 3 mL (3 mL Nebulization Given 10/5/22 0249)   albuterol (PROVENTIL) nebulizer solution 0.083% 2.5 mg/3mL (2.5 mg Nebulization Given 10/5/22 0428)         PROGRESS, DATA ANALYSIS, CONSULTS, AND MEDICAL DECISION MAKING    All labs have been independently reviewed by me.  All radiology studies have been reviewed by me and discussed with radiologist dictating the report.   EKG's independently viewed and interpreted by me.  Discussion below represents my analysis of pertinent findings related to patient's condition, differential diagnosis, treatment plan and final disposition.    DDx: Includes related to asthma exacerbation, allergic rhinitis, URI, COVID, pneumonia, influenza    ED Course as of 10/05/22 0600   Wed Oct 05, 2022   0355 COVID19(!!): Detected [TITO]   0355 WBC(!): 12.67 [TITO]   0355 Hemoglobin: 16.3 [TITO]   0355 Hematocrit: 50.7 [TITO]   0355 Platelets: 322 [TITO]   0355 Glucose: 98 [TITO]   0355 BUN: 11 [TITO]   0355 Creatinine: 1.03 [TITO]   0355 Sodium: 144 [TITO]   0355 Potassium: 4.4 [TITO]    0355 Magnesium: 2.4 [TITO]   0355 Chloride: 107 [TITO]   0355 CO2: 26.8 [TITO]   0356 Procalcitonin: 0.04 [TITO]   0356 Troponin T: <0.010 [TITO]   0356 proBNP: 22.0 [TITO]   0419 Patient rechecked, feeling improved after breathing treatment and steroids.  O2 sats dropped to 8889% on room air while talking.  Will order another breathing treatment. [TITO]   0520 Patient's ambulatory O2 sats dropped to 83 to 85% on room air.  We will plan for admission. [TITO]   0531 EKG          EKG time: 23:56  Rhythm/Rate: Sinus arrhythmia at 79 bpm, PVC present  P waves and MN: Normal  QRS, axis: Normal  ST and T waves: ST elevation, probable normal early repolarization pattern, and no acute ST/T wave changes    Interpreted Contemporaneously by me, independently viewed  Not significantly changed compared to prior EKG of 2/27/2020   [TITO]   0557 Patient history, ER presentation and evaluation discussed with Marry FIGUEROA, will place in observation to a telemetry bed per Dr. Delcid. [TITO]      ED Course User Index  [TITO] Vishal Hernandes PA       MDM: Patient presented with hypoxia and significant wheezing on exam was given IV steroids, multiple breathing treatments, but O2 sats remained in the upper to mid 80s with ambulation on room air.  On evaluation his COVID swab came back positive, but blood work, cardiac evaluation, and chest x-ray showed no active disease.  Patient will be admitted for monitoring and supplemental oxygen.    PPE: The patient wore a surgical mask throughout the entire patient encounter. I wore an N95.    AS OF 06:00 EDT VITALS:    BP - 129/83  HR - 94  TEMP - 97.4 °F (36.3 °C)  O2 SATS - 91%        DIAGNOSIS  Final diagnoses:   COVID-19 virus infection   Moderate persistent asthma with exacerbation   Hypoxia         DISPOSITION  ADMISSION    Discussed treatment plan and reason for admission with pt/family and admitting physician.  Pt/family voiced understanding of the plan for admission for further testing/treatment as  needed.         Note Disclaimer: At [facility], we believe that sharing information builds trust and better relationships. You are receiving this note because you recently visited [facility]. It is possible you will see health information before a provider has talked with you about it. This kind of information can be easy to misunderstand. To help you fully understand what it means for your health, we urge you to discuss this note with your provider.       Vishal Hernandes PA  10/05/22 0559       Vishal Hernandes PA  10/05/22 0600

## 2022-10-05 NOTE — ED TRIAGE NOTES
Pt to ED via personal vehicle with c/o increasingly SOA today. Reports pulse ox at home dropped down to 86%. Reports hx of asthma. Went to urgent care earlier and was advised to come to ER.     Pt in mask and this RN in ppe.

## 2022-10-05 NOTE — ED NOTES
..  Nursing report ED to floor  Vishal Hair  47 y.o.  male    HPI :   Chief Complaint   Patient presents with   • Shortness of Breath       Admitting doctor:   Jhonny Delcid MD    Admitting diagnosis:   The primary encounter diagnosis was COVID-19 virus infection. Diagnoses of Moderate persistent asthma with exacerbation and Hypoxia were also pertinent to this visit.    Code status:   Current Code Status     Date Active Code Status Order ID Comments User Context       10/5/2022 0602 CPR (Attempt to Resuscitate) [id number]  Marry Gongora APRN ED     Advance Care Planning Activity      Questions for Current Code Status     Question Answer    Code Status (Patient has no pulse and is not breathing) CPR (Attempt to Resuscitate)    Medical Interventions (Patient has pulse or is breathing) Full Support          Allergies:   Patient has no known allergies.    Isolation:   Enhanced Droplet/Contact     Intake and Output  No intake or output data in the 24 hours ending 10/05/22 0619    Weight:   There were no vitals filed for this visit.    Most recent vitals:   Vitals:    10/05/22 0521 10/05/22 0525 10/05/22 0605 10/05/22 0606   BP:    130/84   Pulse: 109 94 96    Resp:       Temp:       SpO2: (!) 85% 91% 93%    Height:           Active LDAs/IV Access:   Lines, Drains & Airways     Active LDAs     Name Placement date Placement time Site Days    Peripheral IV 10/05/22 0245 Anterior;Distal;Right;Upper Arm 10/05/22  0245  Arm  less than 1                Labs (abnormal labs have a star):   Labs Reviewed   RESPIRATORY PANEL PCR W/ COVID-19 (SARS-COV-2) SUN/LINDA/SB/PAD/COR/MAD/BOB IN-HOUSE, NP SWAB IN UT/VT, 3-4 HR TAT - Abnormal; Notable for the following components:       Result Value    COVID19 Detected (*)     All other components within normal limits    Narrative:     In the setting of a positive respiratory panel with a viral infection PLUS a negative procalcitonin without other underlying concern for  bacterial infection, consider observing off antibiotics or discontinuation of antibiotics and continue supportive care. If the respiratory panel is positive for atypical bacterial infection (Bordetella pertussis, Chlamydophila pneumoniae, or Mycoplasma pneumoniae), consider antibiotic de-escalation to target atypical bacterial infection.   CBC WITH AUTO DIFFERENTIAL - Abnormal; Notable for the following components:    WBC 12.67 (*)     Eosinophil % 15.3 (*)     Lymphocytes, Absolute 3.49 (*)     Monocytes, Absolute 1.11 (*)     Eosinophils, Absolute 1.94 (*)     All other components within normal limits   TROPONIN (IN-HOUSE) - Normal    Narrative:     Troponin T Reference Range:  <= 0.03 ng/mL-   Negative for AMI  >0.03 ng/mL-     Abnormal for myocardial necrosis.  Clinicians would have to utilize clinical acumen, EKG, Troponin and serial changes to determine if it is an Acute Myocardial Infarction or myocardial injury due to an underlying chronic condition.       Results may be falsely decreased if patient taking Biotin.     BNP (IN-HOUSE) - Normal    Narrative:     Among patients with dyspnea, NT-proBNP is highly sensitive for the detection of acute congestive heart failure. In addition NT-proBNP of <300 pg/ml effectively rules out acute congestive heart failure with 99% negative predictive value.    Results may be falsely decreased if patient taking Biotin.     MAGNESIUM - Normal   PROCALCITONIN - Normal    Narrative:     As a Marker for Sepsis (Non-Neonates):    1. <0.5 ng/mL represents a low risk of severe sepsis and/or septic shock.  2. >2 ng/mL represents a high risk of severe sepsis and/or septic shock.    As a Marker for Lower Respiratory Tract Infections that require antibiotic therapy:    PCT on Admission    Antibiotic Therapy       6-12 Hrs later    >0.5                Strongly Recommended  >0.25 - <0.5        Recommended   0.1 - 0.25          Discouraged              Remeasure/reassess PCT  <0.1          "       Strongly Discouraged     Remeasure/reassess PCT    As 28 day mortality risk marker: \"Change in Procalcitonin Result\" (>80% or <=80%) if Day 0 (or Day 1) and Day 4 values are available. Refer to http://www.[email or web address]-pct-calculator.com    Change in PCT <=80%  A decrease of PCT levels below or equal to 80% defines a positive change in PCT test result representing a higher risk for 28-day all-cause mortality of patients diagnosed with severe sepsis for septic shock.    Change in PCT >80%  A decrease of PCT levels of more than 80% defines a negative change in PCT result representing a lower risk for 28-day all-cause mortality of patients diagnosed with severe sepsis or septic shock.      COMPREHENSIVE METABOLIC PANEL    Narrative:     GFR Normal >60  Chronic Kidney Disease <60  Kidney Failure <15     BASIC METABOLIC PANEL   CBC (NO DIFF)   CBC AND DIFFERENTIAL    Narrative:     The following orders were created for panel order CBC & Differential.  Procedure                               Abnormality         Status                     ---------                               -----------         ------                     CBC Auto Differential[[id number]]        Abnormal            Final result                 Please view results for these tests on the individual orders.       EKG:   ECG 12 Lead   Preliminary Result   HEART RATE= 79  bpm   RR Interval= 759  ms   NC Interval= 138  ms   P Horizontal Axis= -14  deg   P Front Axis= 54  deg   QRSD Interval= 92  ms   QT Interval= 368  ms   QRS Axis= 21  deg   T Wave Axis= 58  deg   - OTHERWISE NORMAL ECG -   Sinus arrhythmia   Ventricular premature complex   ST elev, probable normal early repol pattern   Electronically Signed By:    Date and Time of Study: 2022-10-04 23:56:24      ECG 12 Lead    (Results Pending)       Meds given in ED:   Medications   sodium chloride 0.9 % flush 10 mL (has no administration in time range)   sodium chloride 0.9 % flush 10 mL (has no " administration in time range)   acetaminophen (TYLENOL) tablet 650 mg (has no administration in time range)     Or   acetaminophen (TYLENOL) 160 MG/5ML solution 650 mg (has no administration in time range)     Or   acetaminophen (TYLENOL) suppository 650 mg (has no administration in time range)   ondansetron (ZOFRAN) tablet 4 mg (has no administration in time range)     Or   ondansetron (ZOFRAN) injection 4 mg (has no administration in time range)   calcium carbonate (TUMS) chewable tablet 500 mg (200 mg elemental) (has no administration in time range)   methylPREDNISolone sodium succinate (SOLU-Medrol) injection 60 mg (has no administration in time range)   Enoxaparin Sodium (LOVENOX) syringe 40 mg (has no administration in time range)   methylPREDNISolone sodium succinate (SOLU-Medrol) injection 125 mg (125 mg Intravenous Given 10/5/22 0312)   ipratropium-albuterol (DUO-NEB) nebulizer solution 3 mL (3 mL Nebulization Given 10/5/22 0249)   albuterol (PROVENTIL) nebulizer solution 0.083% 2.5 mg/3mL (2.5 mg Nebulization Given 10/5/22 0428)       Imaging results:  XR Chest 1 View    Result Date: 10/5/2022  No acute findings.  This report was finalized on 10/5/2022 12:52 AM by Dr. Griselda Edmonds M.D.        Ambulatory status:   - up ad leanne    Social issues:   Social History     Socioeconomic History   • Marital status:    Tobacco Use   • Smoking status: Current Every Day Smoker     Packs/day: 0.25     Years: 15.00     Pack years: 3.75     Types: Cigarettes     Start date: 1/1/2020   • Smokeless tobacco: Never Used       NIH Stroke Scale:         Nakita Kaufman RN  10/05/22 06:19 EDT

## 2022-10-05 NOTE — PLAN OF CARE
Goal Outcome Evaluation:         Patient given routine nursing care per MD orders and hospital policies. Patient is comfortable on 4L of oxygen and has not shown any signs or symptoms of distress. Will continue to try and wean patients o2 as appropriate.

## 2022-10-05 NOTE — ED PROVIDER NOTES
MD ATTESTATION NOTE    The BARRETT and I have discussed this patient's history, physical exam, and treatment plan.    I provided a substantive portion of the care of this patient. I personally performed the physical exam, in its entirety. The attached note describes my personal findings.      Vishal Hair is a 47 y.o. male who presents to the ED c/o cough congestion.  States he has been sick for about 2 weeks.  Has shortness of breath getting worse.  Patient has history of asthma.  No chest pain no body aches.      On exam:  GENERAL: not distressed  HENT: nares patent  EYES: no scleral icterus  CV: regular rhythm, regular rate  RESPIRATORY: normal effort, bilateral wheezing  ABDOMEN: soft  MUSCULOSKELETAL: no deformity  NEURO: alert, moves all extremities, follows commands  SKIN: warm, dry    Labs  Recent Results (from the past 24 hour(s))   ECG 12 Lead    Collection Time: 10/04/22 11:56 PM   Result Value Ref Range    QT Interval 368 ms   Comprehensive Metabolic Panel    Collection Time: 10/05/22  1:22 AM    Specimen: Arm, Left; Blood   Result Value Ref Range    Glucose 98 65 - 99 mg/dL    BUN 11 6 - 20 mg/dL    Creatinine 1.03 0.76 - 1.27 mg/dL    Sodium 144 136 - 145 mmol/L    Potassium 4.4 3.5 - 5.2 mmol/L    Chloride 107 98 - 107 mmol/L    CO2 26.8 22.0 - 29.0 mmol/L    Calcium 9.5 8.6 - 10.5 mg/dL    Total Protein 6.9 6.0 - 8.5 g/dL    Albumin 4.50 3.50 - 5.20 g/dL    ALT (SGPT) 29 1 - 41 U/L    AST (SGOT) 28 1 - 40 U/L    Alkaline Phosphatase 98 39 - 117 U/L    Total Bilirubin 0.3 0.0 - 1.2 mg/dL    Globulin 2.4 gm/dL    A/G Ratio 1.9 g/dL    BUN/Creatinine Ratio 10.7 7.0 - 25.0    Anion Gap 10.2 5.0 - 15.0 mmol/L    eGFR 91.3 >60.0 mL/min/1.73   Troponin    Collection Time: 10/05/22  1:22 AM    Specimen: Arm, Left; Blood   Result Value Ref Range    Troponin T <0.010 0.000 - 0.030 ng/mL   BNP    Collection Time: 10/05/22  1:22 AM    Specimen: Arm, Left; Blood   Result Value Ref Range    proBNP 22.0 0.0 - 450.0  pg/mL   Magnesium    Collection Time: 10/05/22  1:22 AM    Specimen: Arm, Left; Blood   Result Value Ref Range    Magnesium 2.4 1.6 - 2.6 mg/dL   Procalcitonin    Collection Time: 10/05/22  1:22 AM    Specimen: Arm, Left; Blood   Result Value Ref Range    Procalcitonin 0.04 0.00 - 0.25 ng/mL   CBC Auto Differential    Collection Time: 10/05/22  1:22 AM    Specimen: Arm, Left; Blood   Result Value Ref Range    WBC 12.67 (H) 3.40 - 10.80 10*3/mm3    RBC 5.74 4.14 - 5.80 10*6/mm3    Hemoglobin 16.3 13.0 - 17.7 g/dL    Hematocrit 50.7 37.5 - 51.0 %    MCV 88.3 79.0 - 97.0 fL    MCH 28.4 26.6 - 33.0 pg    MCHC 32.1 31.5 - 35.7 g/dL    RDW 14.0 12.3 - 15.4 %    RDW-SD 45.2 37.0 - 54.0 fl    MPV 9.0 6.0 - 12.0 fL    Platelets 322 140 - 450 10*3/mm3    Neutrophil % 46.8 42.7 - 76.0 %    Lymphocyte % 27.5 19.6 - 45.3 %    Monocyte % 8.8 5.0 - 12.0 %    Eosinophil % 15.3 (H) 0.3 - 6.2 %    Basophil % 1.2 0.0 - 1.5 %    Immature Grans % 0.4 0.0 - 0.5 %    Neutrophils, Absolute 5.93 1.70 - 7.00 10*3/mm3    Lymphocytes, Absolute 3.49 (H) 0.70 - 3.10 10*3/mm3    Monocytes, Absolute 1.11 (H) 0.10 - 0.90 10*3/mm3    Eosinophils, Absolute 1.94 (H) 0.00 - 0.40 10*3/mm3    Basophils, Absolute 0.15 0.00 - 0.20 10*3/mm3    Immature Grans, Absolute 0.05 0.00 - 0.05 10*3/mm3    nRBC 0.0 0.0 - 0.2 /100 WBC   Respiratory Panel PCR w/COVID-19(SARS-CoV-2) SUN/LINDA/SB/PAD/COR/MAD/BOB In-House, NP Swab in UTM/VTM, 3-4 HR TAT - Swab, Nasopharynx    Collection Time: 10/05/22  2:41 AM    Specimen: Nasopharynx; Swab   Result Value Ref Range    ADENOVIRUS, PCR Not Detected Not Detected    Coronavirus 229E Not Detected Not Detected    Coronavirus HKU1 Not Detected Not Detected    Coronavirus NL63 Not Detected Not Detected    Coronavirus OC43 Not Detected Not Detected    COVID19 Detected (C) Not Detected - Ref. Range    Human Metapneumovirus Not Detected Not Detected    Human Rhinovirus/Enterovirus Not Detected Not Detected    Influenza A PCR Not  Detected Not Detected    Influenza B PCR Not Detected Not Detected    Parainfluenza Virus 1 Not Detected Not Detected    Parainfluenza Virus 2 Not Detected Not Detected    Parainfluenza Virus 3 Not Detected Not Detected    Parainfluenza Virus 4 Not Detected Not Detected    RSV, PCR Not Detected Not Detected    Bordetella pertussis pcr Not Detected Not Detected    Bordetella parapertussis PCR Not Detected Not Detected    Chlamydophila pneumoniae PCR Not Detected Not Detected    Mycoplasma pneumo by PCR Not Detected Not Detected     Critical Care  Performed by: William Velasquez MD  Authorized by: William Velasquez MD     Critical care provider statement:     Critical care time (minutes):  42    Critical care was necessary to treat or prevent imminent or life-threatening deterioration of the following conditions:  Respiratory failure    Critical care was time spent personally by me on the following activities:  Ordering and performing treatments and interventions, ordering and review of laboratory studies, ordering and review of radiographic studies, pulse oximetry, re-evaluation of patient's condition, evaluation of patient's response to treatment, examination of patient and review of old charts          Radiology  XR Chest 1 View    Result Date: 10/5/2022  SINGLE VIEW OF THE CHEST  HISTORY: Shortness of air  COMPARISON: 02/27/2020  FINDINGS: Heart size is within normal limits. No pneumothorax, pleural effusion, or acute infiltrate is seen. There is an old right-sided rib fracture.      No acute findings.  This report was finalized on 10/5/2022 12:52 AM by Dr. Griselda Edmonds M.D.        Medical Decision Making:  ED Course as of 10/05/22 0720   Wed Oct 05, 2022   0355 COVID19(!!): Detected [TITO]   0355 WBC(!): 12.67 [TITO]   0355 Hemoglobin: 16.3 [TITO]   0355 Hematocrit: 50.7 [TITO]   0355 Platelets: 322 [TITO]   0355 Glucose: 98 [TITO]   0355 BUN: 11 [TITO]   0355 Creatinine: 1.03 [TITO]   0355 Sodium: 144 [TITO]   0355  Potassium: 4.4 [TITO]   0355 Magnesium: 2.4 [TITO]   0355 Chloride: 107 [TITO]   0355 CO2: 26.8 [TITO]   0356 Procalcitonin: 0.04 [TITO]   0356 Troponin T: <0.010 [TITO]   0356 proBNP: 22.0 [TITO]   0419 Patient rechecked, feeling improved after breathing treatment and steroids.  O2 sats dropped to 8889% on room air while talking.  Will order another breathing treatment. [TITO]   0520 Patient's ambulatory O2 sats dropped to 83 to 85% on room air.  We will plan for admission. [TITO]   0531 EKG          EKG time: 23:56  Rhythm/Rate: Sinus arrhythmia at 79 bpm, PVC present  P waves and OR: Normal  QRS, axis: Normal  ST and T waves: ST elevation, probable normal early repolarization pattern, and no acute ST/T wave changes    Interpreted Contemporaneously by me, independently viewed  Not significantly changed compared to prior EKG of 2/27/2020   [TITO]   0557 Patient history, ER presentation and evaluation discussed with Marry FIGUEROA, will place in observation to a telemetry bed per Dr. Delcid. [TITO]      ED Course User Index  [TITO] Vishal Hernandes, PA           PPE: Both the patient and I wore a surgical mask throughout the entire patient encounter. I wore protective goggles.     Diagnosis  Final diagnoses:   COVID-19 virus infection   Moderate persistent asthma with exacerbation   Hypoxia        William Velasquez MD  10/05/22 0508       William Velasquez MD  10/05/22 0592       William Velasquez MD  10/05/22 2992

## 2022-10-06 ENCOUNTER — APPOINTMENT (OUTPATIENT)
Dept: GENERAL RADIOLOGY | Facility: HOSPITAL | Age: 47
End: 2022-10-06

## 2022-10-06 LAB
ALBUMIN SERPL-MCNC: 4.4 G/DL (ref 3.5–5.2)
ALBUMIN/GLOB SERPL: 2 G/DL
ALP SERPL-CCNC: 82 U/L (ref 39–117)
ALT SERPL W P-5'-P-CCNC: 24 U/L (ref 1–41)
ANION GAP SERPL CALCULATED.3IONS-SCNC: 12 MMOL/L (ref 5–15)
AST SERPL-CCNC: 24 U/L (ref 1–40)
BASOPHILS # BLD AUTO: 0.04 10*3/MM3 (ref 0–0.2)
BASOPHILS NFR BLD AUTO: 0.2 % (ref 0–1.5)
BILIRUB SERPL-MCNC: 0.3 MG/DL (ref 0–1.2)
BUN SERPL-MCNC: 19 MG/DL (ref 6–20)
BUN/CREAT SERPL: 18.3 (ref 7–25)
CALCIUM SPEC-SCNC: 9.6 MG/DL (ref 8.6–10.5)
CHLORIDE SERPL-SCNC: 105 MMOL/L (ref 98–107)
CO2 SERPL-SCNC: 24 MMOL/L (ref 22–29)
CREAT SERPL-MCNC: 1.04 MG/DL (ref 0.76–1.27)
CRP SERPL-MCNC: 0.33 MG/DL (ref 0–0.5)
DEPRECATED RDW RBC AUTO: 42.8 FL (ref 37–54)
EGFRCR SERPLBLD CKD-EPI 2021: 90.2 ML/MIN/1.73
EOSINOPHIL # BLD AUTO: 0.01 10*3/MM3 (ref 0–0.4)
EOSINOPHIL NFR BLD AUTO: 0 % (ref 0.3–6.2)
ERYTHROCYTE [DISTWIDTH] IN BLOOD BY AUTOMATED COUNT: 13.4 % (ref 12.3–15.4)
GLOBULIN UR ELPH-MCNC: 2.2 GM/DL
GLUCOSE SERPL-MCNC: 151 MG/DL (ref 65–99)
HCT VFR BLD AUTO: 48.3 % (ref 37.5–51)
HGB BLD-MCNC: 16.1 G/DL (ref 13–17.7)
IMM GRANULOCYTES # BLD AUTO: 0.17 10*3/MM3 (ref 0–0.05)
IMM GRANULOCYTES NFR BLD AUTO: 0.7 % (ref 0–0.5)
INR PPP: 1.03 (ref 0.9–1.1)
LYMPHOCYTES # BLD AUTO: 0.75 10*3/MM3 (ref 0.7–3.1)
LYMPHOCYTES NFR BLD AUTO: 3.2 % (ref 19.6–45.3)
MCH RBC QN AUTO: 28.9 PG (ref 26.6–33)
MCHC RBC AUTO-ENTMCNC: 33.3 G/DL (ref 31.5–35.7)
MCV RBC AUTO: 86.7 FL (ref 79–97)
MONOCYTES # BLD AUTO: 0.44 10*3/MM3 (ref 0.1–0.9)
MONOCYTES NFR BLD AUTO: 1.9 % (ref 5–12)
NEUTROPHILS NFR BLD AUTO: 21.9 10*3/MM3 (ref 1.7–7)
NEUTROPHILS NFR BLD AUTO: 94 % (ref 42.7–76)
NRBC BLD AUTO-RTO: 0 /100 WBC (ref 0–0.2)
PLATELET # BLD AUTO: 351 10*3/MM3 (ref 140–450)
PMV BLD AUTO: 9.1 FL (ref 6–12)
POTASSIUM SERPL-SCNC: 4.8 MMOL/L (ref 3.5–5.2)
PROT SERPL-MCNC: 6.6 G/DL (ref 6–8.5)
PROTHROMBIN TIME: 13.4 SECONDS (ref 11.7–14.2)
RBC # BLD AUTO: 5.57 10*6/MM3 (ref 4.14–5.8)
SODIUM SERPL-SCNC: 141 MMOL/L (ref 136–145)
WBC NRBC COR # BLD: 23.31 10*3/MM3 (ref 3.4–10.8)

## 2022-10-06 PROCEDURE — 94799 UNLISTED PULMONARY SVC/PX: CPT

## 2022-10-06 PROCEDURE — 25010000002 ENOXAPARIN PER 10 MG: Performed by: NURSE PRACTITIONER

## 2022-10-06 PROCEDURE — 85025 COMPLETE CBC W/AUTO DIFF WBC: CPT | Performed by: NURSE PRACTITIONER

## 2022-10-06 PROCEDURE — 71045 X-RAY EXAM CHEST 1 VIEW: CPT

## 2022-10-06 PROCEDURE — 86140 C-REACTIVE PROTEIN: CPT | Performed by: NURSE PRACTITIONER

## 2022-10-06 PROCEDURE — 25010000002 METHYLPREDNISOLONE PER 125 MG: Performed by: HOSPITALIST

## 2022-10-06 PROCEDURE — 80053 COMPREHEN METABOLIC PANEL: CPT | Performed by: NURSE PRACTITIONER

## 2022-10-06 PROCEDURE — 94664 DEMO&/EVAL PT USE INHALER: CPT

## 2022-10-06 PROCEDURE — 85610 PROTHROMBIN TIME: CPT | Performed by: NURSE PRACTITIONER

## 2022-10-06 PROCEDURE — 25010000002 METHYLPREDNISOLONE PER 40 MG: Performed by: INTERNAL MEDICINE

## 2022-10-06 PROCEDURE — 36415 COLL VENOUS BLD VENIPUNCTURE: CPT | Performed by: NURSE PRACTITIONER

## 2022-10-06 PROCEDURE — 94761 N-INVAS EAR/PLS OXIMETRY MLT: CPT

## 2022-10-06 PROCEDURE — 25010000002 METHYLPREDNISOLONE PER 125 MG: Performed by: NURSE PRACTITIONER

## 2022-10-06 RX ORDER — METHYLPREDNISOLONE SODIUM SUCCINATE 40 MG/ML
40 INJECTION, POWDER, LYOPHILIZED, FOR SOLUTION INTRAMUSCULAR; INTRAVENOUS EVERY 8 HOURS
Status: DISCONTINUED | OUTPATIENT
Start: 2022-10-06 | End: 2022-10-08 | Stop reason: HOSPADM

## 2022-10-06 RX ORDER — METHYLPREDNISOLONE SODIUM SUCCINATE 125 MG/2ML
60 INJECTION, POWDER, LYOPHILIZED, FOR SOLUTION INTRAMUSCULAR; INTRAVENOUS EVERY 8 HOURS
Status: DISCONTINUED | OUTPATIENT
Start: 2022-10-06 | End: 2022-10-06

## 2022-10-06 RX ORDER — IPRATROPIUM BROMIDE AND ALBUTEROL SULFATE 2.5; .5 MG/3ML; MG/3ML
3 SOLUTION RESPIRATORY (INHALATION)
Status: DISCONTINUED | OUTPATIENT
Start: 2022-10-06 | End: 2022-10-06

## 2022-10-06 RX ORDER — GUAIFENESIN 600 MG/1
1200 TABLET, EXTENDED RELEASE ORAL EVERY 12 HOURS SCHEDULED
Status: DISCONTINUED | OUTPATIENT
Start: 2022-10-06 | End: 2022-10-08 | Stop reason: HOSPADM

## 2022-10-06 RX ORDER — HYDROXYZINE HYDROCHLORIDE 25 MG/1
25 TABLET, FILM COATED ORAL 3 TIMES DAILY PRN
Status: DISCONTINUED | OUTPATIENT
Start: 2022-10-06 | End: 2022-10-08 | Stop reason: HOSPADM

## 2022-10-06 RX ORDER — IPRATROPIUM BROMIDE AND ALBUTEROL SULFATE 2.5; .5 MG/3ML; MG/3ML
3 SOLUTION RESPIRATORY (INHALATION)
Status: DISCONTINUED | OUTPATIENT
Start: 2022-10-06 | End: 2022-10-08 | Stop reason: HOSPADM

## 2022-10-06 RX ORDER — PANTOPRAZOLE SODIUM 40 MG/1
40 TABLET, DELAYED RELEASE ORAL
Status: DISCONTINUED | OUTPATIENT
Start: 2022-10-06 | End: 2022-10-08 | Stop reason: HOSPADM

## 2022-10-06 RX ORDER — MONTELUKAST SODIUM 10 MG/1
10 TABLET ORAL NIGHTLY
Status: DISCONTINUED | OUTPATIENT
Start: 2022-10-06 | End: 2022-10-08 | Stop reason: HOSPADM

## 2022-10-06 RX ADMIN — ACETAMINOPHEN 650 MG: 325 TABLET, FILM COATED ORAL at 12:01

## 2022-10-06 RX ADMIN — IPRATROPIUM BROMIDE AND ALBUTEROL SULFATE 3 ML: .5; 3 SOLUTION RESPIRATORY (INHALATION) at 23:19

## 2022-10-06 RX ADMIN — ALBUTEROL SULFATE 2.5 MG: 2.5 SOLUTION RESPIRATORY (INHALATION) at 08:17

## 2022-10-06 RX ADMIN — METHYLPREDNISOLONE SODIUM SUCCINATE 60 MG: 125 INJECTION, POWDER, FOR SOLUTION INTRAMUSCULAR; INTRAVENOUS at 11:52

## 2022-10-06 RX ADMIN — Medication 10 ML: at 03:36

## 2022-10-06 RX ADMIN — IPRATROPIUM BROMIDE AND ALBUTEROL SULFATE 3 ML: .5; 3 SOLUTION RESPIRATORY (INHALATION) at 15:39

## 2022-10-06 RX ADMIN — Medication 10 ML: at 09:17

## 2022-10-06 RX ADMIN — METHYLPREDNISOLONE SODIUM SUCCINATE 60 MG: 125 INJECTION, POWDER, FOR SOLUTION INTRAMUSCULAR; INTRAVENOUS at 03:13

## 2022-10-06 RX ADMIN — IPRATROPIUM BROMIDE AND ALBUTEROL SULFATE 3 ML: .5; 3 SOLUTION RESPIRATORY (INHALATION) at 19:07

## 2022-10-06 RX ADMIN — ALBUTEROL SULFATE 2.5 MG: 2.5 SOLUTION RESPIRATORY (INHALATION) at 03:15

## 2022-10-06 RX ADMIN — CETIRIZINE HYDROCHLORIDE 5 MG: 10 TABLET ORAL at 09:14

## 2022-10-06 RX ADMIN — GUAIFENESIN 1200 MG: 600 TABLET, EXTENDED RELEASE ORAL at 22:17

## 2022-10-06 RX ADMIN — MONTELUKAST SODIUM 10 MG: 10 TABLET, FILM COATED ORAL at 22:17

## 2022-10-06 RX ADMIN — METHYLPREDNISOLONE SODIUM SUCCINATE 40 MG: 40 INJECTION, POWDER, FOR SOLUTION INTRAMUSCULAR; INTRAVENOUS at 22:18

## 2022-10-06 RX ADMIN — Medication 10 ML: at 22:17

## 2022-10-06 RX ADMIN — HYDROXYZINE HYDROCHLORIDE 25 MG: 25 TABLET ORAL at 12:01

## 2022-10-06 RX ADMIN — HYDROXYZINE HYDROCHLORIDE 25 MG: 25 TABLET ORAL at 03:55

## 2022-10-06 RX ADMIN — IPRATROPIUM BROMIDE AND ALBUTEROL SULFATE 3 ML: .5; 3 SOLUTION RESPIRATORY (INHALATION) at 12:38

## 2022-10-06 RX ADMIN — ENOXAPARIN SODIUM 40 MG: 100 INJECTION SUBCUTANEOUS at 09:14

## 2022-10-06 NOTE — PROGRESS NOTES
[facility]     LOS: 0 days     Subjective:    CC:Shortness of Breath    DIET:  Diet Order   Procedures   • Diet Regular   soa is stable, some cough    No cp  No n/v/d    Objective:    Vital Signs:  Temp:  [97 °F (36.1 °C)-98.9 °F (37.2 °C)] 97.3 °F (36.3 °C)  Heart Rate:  [] 94  Resp:  [18-24] 18  BP: (121-155)/(70-94) 139/70    SpO2:  [85 %-96 %] 92 %  on  Flow (L/min):  [4-8] 8;   Device (Oxygen Therapy): humidified;high-flow nasal cannula  Body mass index is 29.51 kg/m².    Physical Exam  Constitutional:       General: He is not in acute distress.     Appearance: He is not ill-appearing.   HENT:      Head: Normocephalic and atraumatic.   Cardiovascular:      Rate and Rhythm: Normal rate and regular rhythm.   Pulmonary:      Effort: Respiratory distress present.      Breath sounds: No stridor. Wheezing present.   Musculoskeletal:      Right lower leg: No edema.      Left lower leg: No edema.   Neurological:      General: No focal deficit present.      Mental Status: He is oriented to person, place, and time.   Psychiatric:         Mood and Affect: Mood normal.         Behavior: Behavior normal.         Results Review:    Glucose   Date Value Ref Range Status   10/06/2022 151 (H) 65 - 99 mg/dL Final   10/05/2022 98 65 - 99 mg/dL Final     Results from last 7 days   Lab Units 10/06/22  0535   WBC 10*3/mm3 23.31*   HEMOGLOBIN g/dL 16.1   HEMATOCRIT % 48.3   PLATELETS 10*3/mm3 351     Results from last 7 days   Lab Units 10/06/22  0535   SODIUM mmol/L 141   POTASSIUM mmol/L 4.8   CHLORIDE mmol/L 105   CO2 mmol/L 24.0   BUN mg/dL 19   CREATININE mg/dL 1.04   CALCIUM mg/dL 9.6   BILIRUBIN mg/dL 0.3   ALK PHOS U/L 82   ALT (SGPT) U/L 24   AST (SGOT) U/L 24   GLUCOSE mg/dL 151*     Results from last 7 days   Lab Units 10/06/22  0535   INR  1.03     Results from last 7 days   Lab Units 10/05/22  0122   MAGNESIUM mg/dL 2.4     Results from last 7 days   Lab Units 10/05/22  0122   TROPONIN  T ng/mL <0.010     Cultures:  No results found for: BLOODCX, URINECX, WOUNDCX, MRSACX, RESPCX, STOOLCX    I have reviewed daily medications and changes in CPOE    Scheduled meds  cetirizine, 5 mg, Oral, Daily  enoxaparin, 40 mg, Subcutaneous, Daily  ipratropium-albuterol, 3 mL, Nebulization, Q4H - RT  methylPREDNISolone sodium succinate, 60 mg, Intravenous, Q8H  sodium chloride, 10 mL, Intravenous, Q12H           PRN meds  •  acetaminophen **OR** acetaminophen **OR** acetaminophen  •  calcium carbonate  •  hydrOXYzine  •  ondansetron **OR** ondansetron  •  sodium chloride        COVID-19 virus infection    Asthma    Hypoxia    Seasonal allergies    GENTRY (obstructive sleep apnea)        Assessment/Plan:  Asthma exacerbation for which patient is receiving IV steroids and mini nebs every 4 hours  -Patient has elevated eosinophils will need follow-up outpatient  -Pulmonary consultation  -Worsening hypoxemia overnight stable during the day today.  -Dimer negative and Pro-Flex normal     COVID-19- 2 weeks out from likely start of the infection so hold on any COVID-specific treatment.    Hypoxic respiratory failure  -monitor            Elias Villarreal MD  10/06/22  17:06 EDT

## 2022-10-06 NOTE — PLAN OF CARE
Goal Outcome Evaluation:  Plan of Care Reviewed With: patient        Progress: no change  Outcome Evaluation: VSS. Increased from 4lNC to 8LHF, Pulmonary consulted. IV steriods/Dueonebs given with some improvment. Pt extremely anxious, prn atarax added. Will CTM

## 2022-10-06 NOTE — CONSULTS
Group: [facility]         CONSULT NOTE    Patient Identification:    Vishal Hair  47 y.o.  male  1975  [id number]            Patient Care Team:  System, Provider Not In as PCP - General    Requesting physician: Dr. Villarreal    Reason for Consultation: Acute respiratory failure, COVID-19 infection, asthma exacerbation    CC: Cough and shortness of breath    History of Present Illness: 47-year-old white male with previous smoker quit 6 months back with a past medical history significant for severe asthma with recurrent respiratory exacerbations who reportedly was at baseline health until a few days back until he started having some upper respiratory symptoms including congestion and coughing and states that he does not have a daily inhaler and uses only rescue inhaler and feels like he was unable to take it anymore and decided to come to the ER where he was noted to be severely hypoxemic with sats in the low 80s.  Chest x-ray did not show any acute concerns.  Testing showed that he was positive for COVID-19 and was treated  with steroids and bronchodilators for asthma exacerbation and admitted to the hospital.  We have been asked to assist in the management of the patient.    Patient is very anxious during my evaluation and sats are in the mid 90s but on high flow nasal cannula does have some rhonchi and copious wheezing bilaterally states that he is not better yet but improved since he came in states that he does not have a pulmonologist.  Does not use a daily inhaler as he was not aware.  Follows primary care.  Does not use oxygen at home.  Does have a recent diagnosis of sleep apnea started on CPAP    I have reviewed the H&P as well as the notes from the other consultants taking care of the patient this admission as well as previous hospital notes (if any available) from our group physicians and summarized above      Objective     Review of Systems:  Unable to obtain more than about due to  "shortness of breath    Past Medical History:  Past Medical History:   Diagnosis Date   • Asthma    • Hypoxia 10/5/2022   • GENTRY (obstructive sleep apnea) 10/5/2022       Past Surgical History:  History reviewed. No pertinent surgical history.     Home Meds:  Medications Prior to Admission   Medication Sig Dispense Refill Last Dose   • albuterol sulfate  (90 Base) MCG/ACT inhaler Inhale 2 puffs Every 4 (Four) Hours As Needed for Wheezing or Shortness of Air.   10/4/2022 at Unknown time   • cetirizine (zyrTEC) 5 MG tablet Take 5 mg by mouth Daily.   10/4/2022 at Unknown time   • fexofenadine-pseudoephedrine (ALLEGRA-D 24) 180-240 MG per 24 hr tablet Take 1 tablet by mouth Every Night.   10/4/2022 at Unknown time   • guaiFENesin (MUCINEX) 600 MG 12 hr tablet Take 1 tablet by mouth Every 12 (Twelve) Hours. 10 tablet 0 10/4/2022 at Unknown time   • ipratropium-albuterol (DUO-NEB) 0.5-2.5 mg/3 ml nebulizer Inhale 3 mL Every 4 (Four) Hours As Needed.   10/4/2022 at Unknown time   • loratadine (CLARITIN) 10 MG tablet Take 10 mg by mouth Every Night.   10/4/2022 at Unknown time       Allergies:  No Known Allergies    Social History:   Social History     Socioeconomic History   • Marital status:    Tobacco Use   • Smoking status: Former Smoker     Packs/day: 0.25     Years: 15.00     Pack years: 3.75     Types: Cigarettes     Start date: 1/1/2020   • Smokeless tobacco: Never Used   Substance and Sexual Activity   • Alcohol use: Never       Family History:  History reviewed. No pertinent family history.    Physical Exam:  /70 (BP Location: Left arm, Patient Position: Sitting)   Pulse 94   Temp 97.3 °F (36.3 °C) (Oral)   Resp 18   Ht 175.3 cm (69\")   SpO2 92%   BMI 29.51 kg/m²    Body mass index is 29.51 kg/m². 92%      Vent Settings                                           Physical Exam     Constitutional: Middle-aged pt in bed, no acute respiratory distress, + accessory muscle use  Head: - " NCAT  Eyes: No pallor, anicteric conjunctivae, EOMI.  ENMT:  Mallampati 3, no oral thrush. Moist MM.   NECK: Trachea midline, No thyromegaly, no palpable cervical lymphadenopathy  Heart: RRR, no murmur. No pedal edema   Lungs: SAMANTHA +, bilateral rhonchi diffuse wheezes. No crackles heard    Abdomen: Soft. No tenderness, guarding or rigidity. No palpable masses  Extremities: Extremities warm and well perfused. No cyanosis/ clubbing  Neuro: Conscious, answers appropriately, no gross focal neuro deficits  Psych: Mood and affect -anxious    PPE recommended per [facility] infectious disease Isolation protocol for the current clinical scenario (as mentioned below) was followed.     LABS:  Results from last 7 days   Lab Units 10/06/22  0535 10/05/22  0122   SODIUM mmol/L 141 144   POTASSIUM mmol/L 4.8 4.4   CHLORIDE mmol/L 105 107   CO2 mmol/L 24.0 26.8   BUN mg/dL 19 11   CREATININE mg/dL 1.04 1.03   CALCIUM mg/dL 9.6 9.5   BILIRUBIN mg/dL 0.3 0.3   ALK PHOS U/L 82 98   ALT (SGPT) U/L 24 29   AST (SGOT) U/L 24 28   GLUCOSE mg/dL 151* 98   WBC 10*3/mm3 23.31* 12.67*   HEMOGLOBIN g/dL 16.1 16.3   PLATELETS 10*3/mm3 351 322   INR  1.03  --    PROBNP pg/mL  --  22.0   PROCALCITONIN ng/mL  --  0.04       Lab Results   Component Value Date    CALCIUM 9.6 10/06/2022    PHOS 2.0 (L) 02/28/2020           COVID LABS:  Results From Last 14 Days   Lab Units 10/06/22  0535 10/05/22  1303 10/05/22  0122   PROBNP pg/mL  --   --  22.0   CRP mg/dL 0.33 0.47  --    D DIMER QUANT MCGFEU/mL  --  <0.27  --    PROCALCITONIN ng/mL  --   --  0.04   PROTIME Seconds 13.4  --   --    INR  1.03  --   --    TROPONIN T ng/mL  --   --  <0.010            Result Review      I have personally reviewed the results from the time of this admission to 10/6/2022 18:58 EDT and agree with these findings:  [x]  Laboratory  [x]  Microbiology  [x]  Radiology  []  EKG/Telemetry   [x]  Cardiology/Vascular   []  Pathology  []  Old records  []  Other:    Assessment    Acute hypoxic respiratory failure  Acute asthma exacerbation  Acute COVID-19 infection  Previous smoker; quit 6 months back GENTRY on sleep      RECOMMENDATIONS:  Patient acute respiratory failure is likely multifactorial from COVID-19 infection and acute asthma exacerbation in the setting of lack of daily maintenance inhalers and severe respiratory issues with baseline asthma poorly controlled.  Agree with current treatment plan for COVID-19 infection  Will continue with steroids and bronchodilators  Unclear etiology for worsening oxygen needs potentially from atelectasis based on physical exam but we will repeat a chest x-ray.  Procalcitonin has been negative yesterday.  D-dimer is negative as well ruling out other possible etiologies  Will get urine drug screen as well  Continue with home CPAP  Guarded prognosis  DVT prophylaxis: Lovenox    CODE STATUS:    Code Status and Medical Interventions:   Ordered at: 10/05/22 0602     Code Status (Patient has no pulse and is not breathing):    CPR (Attempt to Resuscitate)     Medical Interventions (Patient has pulse or is breathing):    Full Support       Thank you for letting me participate in the care of this pleasant patient.  I have discussed my findings and recommendations with patient.     Beka Segura MD  10/6/2022  18:58 EDT

## 2022-10-06 NOTE — PLAN OF CARE
Goal Outcome Evaluation      Patient given routine nursing care per MD orders and hospital policies. I contacted hospitalist this morning and notified him patient had two episodes of SOA the last day and of having to bump up his oxygen at about 10-11hrs after his solu-medrol was being administered and asked if we could make his solumedrol q8hr instead of q12hr. Hospitalist changed order and also added duonebs. Patient has been reduced from 8L to 4L today and states feels a little better. Still waiting on pulmonology to see the patient. I called the consult when I got here at 7am

## 2022-10-07 LAB
ALBUMIN SERPL-MCNC: 4.2 G/DL (ref 3.5–5.2)
ALBUMIN/GLOB SERPL: 2.8 G/DL
ALP SERPL-CCNC: 70 U/L (ref 39–117)
ALT SERPL W P-5'-P-CCNC: 24 U/L (ref 1–41)
ANION GAP SERPL CALCULATED.3IONS-SCNC: 10.2 MMOL/L (ref 5–15)
AST SERPL-CCNC: 18 U/L (ref 1–40)
BASOPHILS # BLD AUTO: 0.02 10*3/MM3 (ref 0–0.2)
BASOPHILS NFR BLD AUTO: 0.1 % (ref 0–1.5)
BILIRUB SERPL-MCNC: 0.4 MG/DL (ref 0–1.2)
BUN SERPL-MCNC: 24 MG/DL (ref 6–20)
BUN/CREAT SERPL: 23.8 (ref 7–25)
CALCIUM SPEC-SCNC: 9.1 MG/DL (ref 8.6–10.5)
CHLORIDE SERPL-SCNC: 104 MMOL/L (ref 98–107)
CO2 SERPL-SCNC: 24.8 MMOL/L (ref 22–29)
CREAT SERPL-MCNC: 1.01 MG/DL (ref 0.76–1.27)
CRP SERPL-MCNC: <0.3 MG/DL (ref 0–0.5)
D DIMER PPP FEU-MCNC: <0.27 MCGFEU/ML (ref 0–0.49)
DEPRECATED RDW RBC AUTO: 42 FL (ref 37–54)
EGFRCR SERPLBLD CKD-EPI 2021: 93.5 ML/MIN/1.73
EOSINOPHIL # BLD AUTO: 0 10*3/MM3 (ref 0–0.4)
EOSINOPHIL NFR BLD AUTO: 0 % (ref 0.3–6.2)
ERYTHROCYTE [DISTWIDTH] IN BLOOD BY AUTOMATED COUNT: 13.6 % (ref 12.3–15.4)
GLOBULIN UR ELPH-MCNC: 1.5 GM/DL
GLUCOSE SERPL-MCNC: 128 MG/DL (ref 65–99)
HCT VFR BLD AUTO: 44.3 % (ref 37.5–51)
HGB BLD-MCNC: 15.1 G/DL (ref 13–17.7)
IMM GRANULOCYTES # BLD AUTO: 0.23 10*3/MM3 (ref 0–0.05)
IMM GRANULOCYTES NFR BLD AUTO: 1.1 % (ref 0–0.5)
LYMPHOCYTES # BLD AUTO: 0.74 10*3/MM3 (ref 0.7–3.1)
LYMPHOCYTES NFR BLD AUTO: 3.5 % (ref 19.6–45.3)
MCH RBC QN AUTO: 29 PG (ref 26.6–33)
MCHC RBC AUTO-ENTMCNC: 34.1 G/DL (ref 31.5–35.7)
MCV RBC AUTO: 85.2 FL (ref 79–97)
MONOCYTES # BLD AUTO: 0.51 10*3/MM3 (ref 0.1–0.9)
MONOCYTES NFR BLD AUTO: 2.4 % (ref 5–12)
NEUTROPHILS NFR BLD AUTO: 19.55 10*3/MM3 (ref 1.7–7)
NEUTROPHILS NFR BLD AUTO: 92.9 % (ref 42.7–76)
NRBC BLD AUTO-RTO: 0 /100 WBC (ref 0–0.2)
PLATELET # BLD AUTO: 293 10*3/MM3 (ref 140–450)
PMV BLD AUTO: 9.6 FL (ref 6–12)
POTASSIUM SERPL-SCNC: 4.5 MMOL/L (ref 3.5–5.2)
PROT SERPL-MCNC: 5.7 G/DL (ref 6–8.5)
RBC # BLD AUTO: 5.2 10*6/MM3 (ref 4.14–5.8)
SODIUM SERPL-SCNC: 139 MMOL/L (ref 136–145)
WBC NRBC COR # BLD: 21.05 10*3/MM3 (ref 3.4–10.8)

## 2022-10-07 PROCEDURE — 94664 DEMO&/EVAL PT USE INHALER: CPT

## 2022-10-07 PROCEDURE — 80053 COMPREHEN METABOLIC PANEL: CPT | Performed by: NURSE PRACTITIONER

## 2022-10-07 PROCEDURE — 94799 UNLISTED PULMONARY SVC/PX: CPT

## 2022-10-07 PROCEDURE — 85379 FIBRIN DEGRADATION QUANT: CPT | Performed by: NURSE PRACTITIONER

## 2022-10-07 PROCEDURE — 25010000002 METHYLPREDNISOLONE PER 40 MG: Performed by: INTERNAL MEDICINE

## 2022-10-07 PROCEDURE — 94760 N-INVAS EAR/PLS OXIMETRY 1: CPT

## 2022-10-07 PROCEDURE — 25010000002 ENOXAPARIN PER 10 MG: Performed by: NURSE PRACTITIONER

## 2022-10-07 PROCEDURE — 86140 C-REACTIVE PROTEIN: CPT | Performed by: NURSE PRACTITIONER

## 2022-10-07 PROCEDURE — 85025 COMPLETE CBC W/AUTO DIFF WBC: CPT | Performed by: NURSE PRACTITIONER

## 2022-10-07 PROCEDURE — 94761 N-INVAS EAR/PLS OXIMETRY MLT: CPT

## 2022-10-07 RX ADMIN — IPRATROPIUM BROMIDE AND ALBUTEROL SULFATE 3 ML: .5; 3 SOLUTION RESPIRATORY (INHALATION) at 19:52

## 2022-10-07 RX ADMIN — Medication 10 ML: at 20:40

## 2022-10-07 RX ADMIN — ENOXAPARIN SODIUM 40 MG: 100 INJECTION SUBCUTANEOUS at 07:46

## 2022-10-07 RX ADMIN — ACETAMINOPHEN 650 MG: 325 TABLET, FILM COATED ORAL at 07:45

## 2022-10-07 RX ADMIN — IPRATROPIUM BROMIDE AND ALBUTEROL SULFATE 3 ML: .5; 3 SOLUTION RESPIRATORY (INHALATION) at 15:56

## 2022-10-07 RX ADMIN — METHYLPREDNISOLONE SODIUM SUCCINATE 40 MG: 40 INJECTION, POWDER, FOR SOLUTION INTRAMUSCULAR; INTRAVENOUS at 20:40

## 2022-10-07 RX ADMIN — METHYLPREDNISOLONE SODIUM SUCCINATE 40 MG: 40 INJECTION, POWDER, FOR SOLUTION INTRAMUSCULAR; INTRAVENOUS at 12:10

## 2022-10-07 RX ADMIN — GUAIFENESIN 1200 MG: 600 TABLET, EXTENDED RELEASE ORAL at 07:45

## 2022-10-07 RX ADMIN — MONTELUKAST SODIUM 10 MG: 10 TABLET, FILM COATED ORAL at 20:40

## 2022-10-07 RX ADMIN — IPRATROPIUM BROMIDE AND ALBUTEROL SULFATE 3 ML: .5; 3 SOLUTION RESPIRATORY (INHALATION) at 02:43

## 2022-10-07 RX ADMIN — GUAIFENESIN 1200 MG: 600 TABLET, EXTENDED RELEASE ORAL at 20:40

## 2022-10-07 RX ADMIN — METHYLPREDNISOLONE SODIUM SUCCINATE 40 MG: 40 INJECTION, POWDER, FOR SOLUTION INTRAMUSCULAR; INTRAVENOUS at 05:20

## 2022-10-07 RX ADMIN — IPRATROPIUM BROMIDE AND ALBUTEROL SULFATE 3 ML: .5; 3 SOLUTION RESPIRATORY (INHALATION) at 08:06

## 2022-10-07 RX ADMIN — HYDROXYZINE HYDROCHLORIDE 25 MG: 25 TABLET ORAL at 07:46

## 2022-10-07 RX ADMIN — Medication 10 ML: at 12:10

## 2022-10-07 RX ADMIN — HYDROXYZINE HYDROCHLORIDE 25 MG: 25 TABLET ORAL at 20:40

## 2022-10-07 RX ADMIN — IPRATROPIUM BROMIDE AND ALBUTEROL SULFATE 3 ML: .5; 3 SOLUTION RESPIRATORY (INHALATION) at 23:37

## 2022-10-07 RX ADMIN — IPRATROPIUM BROMIDE AND ALBUTEROL SULFATE 3 ML: .5; 3 SOLUTION RESPIRATORY (INHALATION) at 11:26

## 2022-10-07 RX ADMIN — CETIRIZINE HYDROCHLORIDE 5 MG: 10 TABLET ORAL at 07:45

## 2022-10-07 RX ADMIN — PANTOPRAZOLE SODIUM 40 MG: 40 TABLET, DELAYED RELEASE ORAL at 05:20

## 2022-10-07 NOTE — PLAN OF CARE
Goal Outcome Evaluation:  Plan of Care Reviewed With: patient        Progress: improving  Outcome Evaluation: VSS. Increased from 4L to 6HF. No further changes. Will CTM

## 2022-10-07 NOTE — CASE MANAGEMENT/SOCIAL WORK
Discharge Planning Assessment  [facility]     Patient Name: Vishal Hair  MRN: [id number]  Today's Date: 10/7/2022    Admit Date: 10/5/2022    Plan: Home with wife, follow for nebulzier and o2   Discharge Needs Assessment     Row Name 10/07/22 1435       Living Environment    People in Home spouse;child(breanna), dependent    Current Living Arrangements home    Primary Care Provided by self    Provides Primary Care For child(breanna)    Family Caregiver if Needed spouse    Quality of Family Relationships helpful;involved;supportive    Able to Return to Prior Arrangements yes       Resource/Environmental Concerns    Resource/Environmental Concerns home accessibility    Home Accessibility Concerns stairs to enter home       Transition Planning    Patient/Family Anticipates Transition to home with family    Patient/Family Anticipated Services at Transition durable medical equipment    Transportation Anticipated family or friend will provide;car, drives self       Discharge Needs Assessment    Readmission Within the Last 30 Days no previous admission in last 30 days    Equipment Currently Used at Home none    Concerns to be Addressed discharge planning    Equipment Needed After Discharge nebulizer               Discharge Plan     Row Name 10/07/22 1434       Plan    Plan Home with wife, follow for nebulzier and o2    Patient/Family in Agreement with Plan yes    Provided Post Acute Provider List? Yes    Post Acute Provider List DME Supplier    Provided Post Acute Provider Quality & Resource List? Refused    Plan Comments CCP spoke with pt via telephone due to covid isolation, introduced self and explained CCP role. Verified facesheet and confirmed local pharmacy is [facility]. Pt denies problems with medication costs. Offered meds to beds and pt wishes to enroll. Pt denies advance directive and confirms emergency contact is his wife Rosalba Hair [phone]. Pt does not have a PCP, explained would have RN provide [facility] PCP info.  Pt lives at home with wife and 8 year old dtr, 3 Steps to enter the home. Prior to admission pt is IADL with mobility and denies any DME, HH or SNF Hx. Pt states he has used his dtrs nebulizer in the past. CCP offered to get his own if needed at dc. Pt agreeable and denies any DME preference and ok with [facility]. Notified Dr. Villarreal. CCP to follow for continued o2 use and any other dc needs. Plan is home with family. Devika SLOAN/CCP    Row Name 10/07/22 1321       Plan    Plan Comments Attempted to screen pt, called into room x2 due to covid isolation and no answer. CCP will attempt again later. Devika SLOAN/CCP              Continued Care and Services - Admitted Since 10/5/2022    Coordination has not been started for this encounter.       Expected Discharge Date and Time     Expected Discharge Date Expected Discharge Time    Oct 9, 2022          Demographic Summary     Row Name 10/07/22 1434       General Information    Admission Type inpatient    Referral Source admission list    Reason for Consult discharge planning    Preferred Language English       Contact Information    Permission Granted to Share Info With family/designee;facility                Functional Status     Row Name 10/07/22 1435       Functional Status    Usual Activity Tolerance good    Current Activity Tolerance good       Functional Status, IADL    Medications independent    Meal Preparation independent    Housekeeping independent    Laundry independent    Shopping independent               Psychosocial    No documentation.                Abuse/Neglect    No documentation.                Legal    No documentation.                Substance Abuse    No documentation.                Patient Forms    No documentation.                   Xiomara Doll RN

## 2022-10-07 NOTE — PROGRESS NOTES
[facility]     LOS: 1 day     Subjective:    CC:Shortness of Breath    DIET:  Diet Order   Procedures   • Diet Regular   breathing is improved slightly    No cp  No n/v/d    Objective:    Vital Signs:  Temp:  [97.1 °F (36.2 °C)-97.4 °F (36.3 °C)] 97.4 °F (36.3 °C)  Heart Rate:  [] 100  Resp:  [18] 18  BP: (101-136)/(82-97) 136/97    SpO2:  [90 %-96 %] 92 %  on  Flow (L/min):  [4-8] 6;   Device (Oxygen Therapy): humidified;high-flow nasal cannula  Body mass index is 29.51 kg/m².    Physical Exam  Constitutional:       General: He is not in acute distress.     Appearance: He is not ill-appearing.   HENT:      Head: Normocephalic and atraumatic.   Cardiovascular:      Rate and Rhythm: Normal rate and regular rhythm.   Pulmonary:      Effort: No respiratory distress.      Breath sounds: No stridor. Wheezing present.      Comments: Breathing is improved, markedly prolonged exp phase  Musculoskeletal:      Right lower leg: No edema.      Left lower leg: No edema.   Neurological:      General: No focal deficit present.      Mental Status: He is oriented to person, place, and time.   Psychiatric:         Mood and Affect: Mood normal.         Behavior: Behavior normal.         Results Review:    Glucose   Date Value Ref Range Status   10/07/2022 128 (H) 65 - 99 mg/dL Final   10/06/2022 151 (H) 65 - 99 mg/dL Final   10/05/2022 98 65 - 99 mg/dL Final     Results from last 7 days   Lab Units 10/07/22  0507   WBC 10*3/mm3 21.05*   HEMOGLOBIN g/dL 15.1   HEMATOCRIT % 44.3   PLATELETS 10*3/mm3 293     Results from last 7 days   Lab Units 10/07/22  0507   SODIUM mmol/L 139   POTASSIUM mmol/L 4.5   CHLORIDE mmol/L 104   CO2 mmol/L 24.8   BUN mg/dL 24*   CREATININE mg/dL 1.01   CALCIUM mg/dL 9.1   BILIRUBIN mg/dL 0.4   ALK PHOS U/L 70   ALT (SGPT) U/L 24   AST (SGOT) U/L 18   GLUCOSE mg/dL 128*     Results from last 7 days   Lab Units 10/06/22  0535   INR  1.03     Results from last 7 days   Lab  Units 10/05/22  0122   MAGNESIUM mg/dL 2.4     Results from last 7 days   Lab Units 10/05/22  0122   TROPONIN T ng/mL <0.010     Cultures:  No results found for: BLOODCX, URINECX, WOUNDCX, MRSACX, RESPCX, STOOLCX    I have reviewed daily medications and changes in CPOE    Scheduled meds  cetirizine, 5 mg, Oral, Daily  enoxaparin, 40 mg, Subcutaneous, Daily  guaiFENesin, 1,200 mg, Oral, Q12H  ipratropium-albuterol, 3 mL, Nebulization, Q4H - RT  methylPREDNISolone sodium succinate, 40 mg, Intravenous, Q8H  montelukast, 10 mg, Oral, Nightly  pantoprazole, 40 mg, Oral, Q AM  sodium chloride, 10 mL, Intravenous, Q12H           PRN meds  •  acetaminophen **OR** acetaminophen **OR** acetaminophen  •  calcium carbonate  •  hydrOXYzine  •  ondansetron **OR** ondansetron  •  sodium chloride        COVID-19 virus infection    Asthma    Hypoxia    Seasonal allergies    GENTRY (obstructive sleep apnea)        Assessment/Plan:  Asthma exacerbation for which patient is receiving IV steroids and mini nebs every 4 hours  -Patient has elevated eosinophils will need follow-up outpatient  -d/w pulmonary today  -hypoxemia is slightly improved  -Dimer negative and Pro-Flex normal     COVID-19- 2 weeks out from likely start of the infection so hold on any COVID-specific treatment. Continue with steroids, d/w pulmonary and they are in agreement    Hypoxic respiratory failure  -monitor    dvt ppx with lovneox          Elias Villarreal MD  10/07/22  14:20 EDT

## 2022-10-07 NOTE — PROGRESS NOTES
Beka Segura MD                          [phone]            Patient ID:    Name:  Vishal Hair    MRN:  [id number]    1975   47 y.o.  male            Patient Care Team:  System, Provider Not In as PCP - General    CC/ Reason for visit: Acute hypoxic respiratory failure, severe asthma exacerbation, COVID-19 infection,     Subjective: Pt seen and examined this AM. No acute overnight events noted. Doing better.  Still with significant oxygen needs.  Still in the bed.  States that he is feeling better.  Able to cough up some secretions.  Repeat chest x-ray reviewed    ROS: Denies any subjective fevers, syncope or presyncopal events, new neurological deficits, nausea or vomiting currently    Objective     Vital Signs past 24hrs    BP range: BP: (101-136)/(82-97) 136/97  Pulse range: Heart Rate:  [] 100  Resp rate range: Resp:  [18] 18  Temp range: Temp (24hrs), Av.2 °F (36.2 °C), Min:97.1 °F (36.2 °C), Max:97.4 °F (36.3 °C)      Ventilator/Non-Invasive Ventilation Settings (From admission, onward)            None          Vent Settings                                         Device (Oxygen Therapy): humidified;high-flow nasal cannula        ; Body mass index is 29.51 kg/m².      Intake/Output Summary (Last 24 hours) at 10/7/2022 1412  Last data filed at 10/7/2022 0814  Gross per 24 hour   Intake 440 ml   Output 600 ml   Net -160 ml       PHYSICAL EXAM   Constitutional: Middle-aged pt in bed, no acute respiratory distress, + accessory muscle use  Head: - NCAT  Eyes: No pallor, anicteric conjunctivae, EOMI.  ENMT:  Mallampati 3, no oral thrush. Moist MM.   NECK: Trachea midline, No thyromegaly, no palpable cervical lymphadenopathy  Heart: RRR, no murmur. No pedal edema   Lungs: SAMANTHA +, bilateral rhonchi diffuse wheezes. No crackles heard    Abdomen: Soft. No tenderness, guarding or rigidity. No palpable masses  Extremities: Extremities warm and well perfused. No  cyanosis/ clubbing  Neuro: Conscious, answers appropriately, no gross focal neuro deficits  Psych: Mood and affect -anxious    PPE recommended per [facility] infectious disease Isolation protocol for the current clinical scenario (as mentioned below) was followed.     Scheduled meds:  cetirizine, 5 mg, Oral, Daily  enoxaparin, 40 mg, Subcutaneous, Daily  guaiFENesin, 1,200 mg, Oral, Q12H  ipratropium-albuterol, 3 mL, Nebulization, Q4H - RT  methylPREDNISolone sodium succinate, 40 mg, Intravenous, Q8H  montelukast, 10 mg, Oral, Nightly  pantoprazole, 40 mg, Oral, Q AM  sodium chloride, 10 mL, Intravenous, Q12H        IV meds:                           Data Review:      Results from last 7 days   Lab Units 10/07/22  0507 10/06/22  0535 10/05/22  0122   SODIUM mmol/L 139 141 144   POTASSIUM mmol/L 4.5 4.8 4.4   CHLORIDE mmol/L 104 105 107   CO2 mmol/L 24.8 24.0 26.8   BUN mg/dL 24* 19 11   CREATININE mg/dL 1.01 1.04 1.03   CALCIUM mg/dL 9.1 9.6 9.5   BILIRUBIN mg/dL 0.4 0.3 0.3   ALK PHOS U/L 70 82 98   ALT (SGPT) U/L 24 24 29   AST (SGOT) U/L 18 24 28   GLUCOSE mg/dL 128* 151* 98   WBC 10*3/mm3 21.05* 23.31* 12.67*   HEMOGLOBIN g/dL 15.1 16.1 16.3   PLATELETS 10*3/mm3 293 351 322   INR   --  1.03  --    PROBNP pg/mL  --   --  22.0   PROCALCITONIN ng/mL  --   --  0.04       Lab Results   Component Value Date    CALCIUM 9.1 10/07/2022    PHOS 2.0 (L) 02/28/2020                    I have personally reviewed the results from the time of this admission to 10/7/2022 14:12 EDT and agree with these findings:  [x]  Laboratory  [x]  Microbiology  [x]  Radiology  []  EKG/Telemetry   [x]  Cardiology/Vascular   []  Pathology  []  Old records    Assessment    Acute hypoxic respiratory failure  Bibasilar atelectasis  Acute asthma exacerbation; severe  Acute COVID-19 infection  Severe eosinophila  Previous smoker; quit 6 months back GENTRY on sleep     RECOMMENDATIONS:  Patient stable from a respiratory standpoint.  Still with  significant oxygen needs likely from atelectasis based on chest x-ray and poor secretion clearance in the setting of severe asthma.  We will add mucus clearance techniques and recommended him to be sitting up in a chair and not be in the bed  Continue with steroids and bronchodilators for asthma  Ongoing treatment for COVID-19 infection per primary  Continue with home CPAP  Guarded prognosis    DVT prophylaxis: Lovenox    Will need to be discharged on long-term inhalers and will need outpatient follow-up for his severe asthma and frequent respiratory exacerbations.    Beka Segura MD  10/7/2022

## 2022-10-07 NOTE — CASE MANAGEMENT/SOCIAL WORK
Continued Stay Note  [facility]     Patient Name: Vishal Hair  MRN: [id number]  Today's Date: 10/7/2022    Admit Date: 10/5/2022        Discharge Plan     Row Name 10/07/22 1321       Plan    Plan Comments Attempted to screen pt, called into room x2 due to covid isolation and no answer. CCP will attempt again later. Devika SLOAN/CCP               Discharge Codes    No documentation.               Expected Discharge Date and Time     Expected Discharge Date Expected Discharge Time    Oct 9, 2022             Xiomara Doll, LUTHER

## 2022-10-07 NOTE — PLAN OF CARE
Goal Outcome Evaluation:       Patient given routine nursing care per MD orders and hospital policies. Patient continued to show signs of respiratory system improvement today and is now down to 3 liters o2.

## 2022-10-07 NOTE — PAYOR COMM NOTE
"Quentin Rodríguez (47 y.o. Male)     PLEASE SEE ATTACHED FOR INPT AUTH.    REF # [id number]        PLEASE CALL KASIE @ [phone] OR [phone]    THANK YOU   KASIE VINCENT RN/CCP            Date of Birth   1975    Social Security Number       Address   [address] [place] [id number]    Home Phone   [phone]    MRN   [id number]       Taoist   Tenriism    Marital Status                               Admission Date   10/5/22    Admission Type   Emergency    Admitting Provider   Jhonny Delcid MD    Attending Provider   Elias Villarreal MD    Department, Room/Bed   [address], [address]/1       Discharge Date       Discharge Disposition       Discharge Destination                               Attending Provider: Elias Villarreal MD    Allergies: No Known Allergies    Isolation: Enh Drop/Con   Infection: COVID (confirmed) (10/05/22)   Code Status: CPR   Advance Care Planning Activity    Ht: 175.3 cm (69\")   Wt: --    Admission Cmt: None   Principal Problem: COVID-19 virus infection [U07.1]                 Active Insurance as of 10/5/2022     Primary Coverage     Payor Plan Insurance Group Employer/Plan Group    [facility] [employer]UE CROSS [employer] EM[employer]EE [id number]     Payor Plan Address Payor Plan Phone Number Payor Plan Fax Number Effective Dates    [address] [phone]  9/1/2019 - None Entered    [place] [address]       Subscriber Name Subscriber Birth Date Member ID       QUENTIN RODRÍGUEZ 1975 [id number]                 Emergency Contacts      (Rel.) Home Phone Work Phone Mobile Phone    luis angel rodríguez (Spouse) -- -- [phone]            [place]: [facility] [id number]  Tax ID [id number]     History & Physical      Gabby Rodriguez APRN at 10/05/22 0827     Attestation signed by Darvin Powell MD at 10/05/22 1324 (Updated)    46-year-old male with a past medical history of asthma comes to the hospital after experiencing cough, " congestion, and dyspnea.  In the ER he was only hypoxic, with oxygen saturation down to the low 80s.  He was started on breathing treatment and given IV Solu-Medrol.  He was admitted for further evaluation and management.    General: Alert and oriented x3, no acute distress.  HEENT: Normocephalic, atraumatic  Eyes: PERRL, EOMI, anicteric sclera  Lungs: Marked expiratory wheezes.  Normal work of breathing.  CV: Regular rate and rhythm, no murmurs rubs or gallops  Abdomen: Soft, nontender, nondistended.  Normoactive bowel sounds  Extremities: No significant peripheral edema  Skin: Clean/dry/intact, no rashes  Neuro: Cranial nerves II through XII intact, no gross focal neurological deficits appreciated  Psych: Appropriate mood and affect    Assessment/Plan    Asthma exacerbation  COVID-19 virus infection  Hypoxic respiratory failure  Continue breathing treatment and IV Solu-Medrol  Respiratory viral panel positive for COVID.  Not currently requiring supplemental oxygen at rest  Continue IV steroids every 24 hours as well as breathing treatments    GENTRY  Use home CPAP                          [facility] Services      Patient Name: Vishal Hair  : 1975  MRN: [id number]  Primary Care Physician:  System, Provider Not In  Date of admission: 10/5/2022      Subjective      Chief Complaint: Shortness of Breath     History of Present Illness: Vishal Hair is a 47 y.o. male w/PMH of asthma  who presented to [facility]  on 10/5/2022 complaining of two week history cough, congestion and worsening shortness of breath. Patient reports that he has asthma and thought he was having a worse than normal flair after recently mowing several lawns. He states he went to the urgent care yesterday for evaluation, and to ask for oral steroids because they have been very helpful in the past. His oxygen saturation was reported to be 84% so he was given a breathing treatment and advised to report to  the emergency department for further treatment. Patient also reports recently being started on CPAP for GENTRY. He denies any other acute distress, chest pain, palpitations, syncope, lightheadedness, headache, abdominal pain, nausea, vomiting, diarrhea, constipation or  symptoms.       Upon arrival to the emergency department patient was found to be hypoxic with oxygent saturation of 84%. He was administered breathing treatments and solumedrol, with reported improvement. Labs are unremarkable with exception of slightly elevated white count of 12.63, 15.3% eosinophils, Respiratory PCR is positive for Covid 19.   I have reviewed Chest X Ray- interpreted as no acute cardiopulmonary processes.   EKG reviewed and interpreted as normal sinus rhythm heart rate 94 with no ectopy noted.      Patient will be admitted for further observation and treatment of COVID-19 and other acute and or chronic conditions as needed.      Review of Systems   HENT: Positive for congestion and sore throat.    Eyes: Negative.    Cardiovascular: Negative.    Respiratory: Positive for cough, shortness of breath and sputum production.    Endocrine: Negative.    Hematologic/Lymphatic: Negative.    Skin: Negative.    Musculoskeletal: Negative.    Genitourinary: Negative.    Neurological: Negative.    Psychiatric/Behavioral: Negative.    Allergic/Immunologic: Positive for environmental allergies.   All other systems reviewed and are negative.       Personal History     Past Medical History:   Diagnosis Date   • Asthma    • Hypoxia 10/5/2022       History reviewed. No pertinent surgical history.    Family History: family history is not on file.   Social History:  reports that he has quit smoking. His smoking use included cigarettes. He started smoking about 2 years ago. He has a 3.75 pack-year smoking history. He has never used smokeless tobacco. He reports that he does not drink alcohol.    Home Medications:  Prior to Admission Medications      Prescriptions Last Dose Informant Patient Reported? Taking?    albuterol sulfate  (90 Base) MCG/ACT inhaler 10/4/2022  Yes Yes    Inhale 2 puffs Every 4 (Four) Hours As Needed for Wheezing or Shortness of Air.    cetirizine (zyrTEC) 5 MG tablet 10/4/2022  Yes Yes    Take 5 mg by mouth Daily.    fexofenadine-pseudoephedrine (ALLEGRA-D 24) 180-240 MG per 24 hr tablet 10/4/2022  Yes Yes    Take 1 tablet by mouth Every Night.    guaiFENesin (MUCINEX) 600 MG 12 hr tablet 10/4/2022  No Yes    Take 1 tablet by mouth Every 12 (Twelve) Hours.    ipratropium-albuterol (DUO-NEB) 0.5-2.5 mg/3 ml nebulizer 10/4/2022  Yes Yes    Inhale 3 mL Every 4 (Four) Hours As Needed.    loratadine (CLARITIN) 10 MG tablet 10/4/2022  Yes Yes    Take 10 mg by mouth Every Night.            Allergies:  No Known Allergies    Objective      Vitals:   Temp:  [97.4 °F (36.3 °C)-97.9 °F (36.6 °C)] 97.9 °F (36.6 °C)  Heart Rate:  [] 99  Resp:  [18-24] 22  BP: (121-162)/(70-96) 136/96  Flow (L/min):  [0-2] 0    Physical Exam  Vitals and nursing note reviewed.   Constitutional:       Appearance: Normal appearance.   HENT:      Nose: Congestion present.      Mouth/Throat:      Mouth: Mucous membranes are dry.   Cardiovascular:      Rate and Rhythm: Normal rate and regular rhythm.      Pulses: Normal pulses.      Heart sounds: Normal heart sounds.   Pulmonary:      Effort: Pulmonary effort is normal.      Breath sounds: Examination of the right-lower field reveals decreased breath sounds. Examination of the left-lower field reveals decreased breath sounds. Decreased breath sounds present.   Abdominal:      General: Bowel sounds are normal.      Palpations: Abdomen is soft.   Skin:     General: Skin is warm and dry.      Capillary Refill: Capillary refill takes less than 2 seconds.   Neurological:      General: No focal deficit present.      Mental Status: He is alert and oriented to person, place, and time.   Psychiatric:         Mood and  Affect: Mood normal.          Result Review    Result Review:  I have personally reviewed the results from the time of this admission to 10/5/2022 12:08 EDT and agree with these findings:  [x]  Laboratory  [x]  Microbiology  [x]  Radiology  [x]  EKG/Telemetry   []  Cardiology/Vascular   []  Pathology  []  Old records  []  Other:  Most notable findings include:       Assessment & Plan        Active Hospital Problems:  Active Hospital Problems    Diagnosis    • **COVID-19 virus infection    • Hypoxia      Plan:   COVID-19 virus infection  PCR confirmed  Continue Enhanced droplet isolation   Continue IV solu medrol   Disease surveillance and progression monitoring labs ordered  Symptom management medications ordered  Encourage pulmonary hygiene-TCDB-IS  Continuous cardiac monitoring and pulse oximetry  Supplemental oxygen as needed to maintain oxygen saturation greater than 90%  As needed duo nebs ordered for shortness or breath/ Wheezing     Hypoxia  Secondary to Covid 19   Treatment as above     Obstructive sleep apnea   On C-pap reports compliance   Family to bring home machine to bedside   Continuous pulse oximetry      Asthma  Chronic normally well controlled   Albuterol rescue inhaler at home will hold for now Duo nebs ordered  Likely in exacerbation secondary to Covid 19 infection     Seasonal Allergies   Chronic   Continue home Zyrtec with formulary substitute       DVT prophylaxis:  Medical and mechanical DVT prophylaxis orders are present.    CODE STATUS:    Code Status (Patient has no pulse and is not breathing): CPR (Attempt to Resuscitate)  Medical Interventions (Patient has pulse or is breathing): Full Support    Admission Status:  I believe this patient meets  observation status.    I discussed the patient's findings and my recommendations with patient.    This patient has been examined wearing appropriate Personal Protective Equipment     Signature: Electronically signed by CAROLINA Borjas,  10/05/22, 8:28 AM EDT.      Electronically signed by Darvin Powell MD at 10/05/22 1324       Oxygen Therapy (last 2 days)     Date/Time SpO2 Device (Oxygen Therapy) Flow (L/min) Oxygen Concentration (%) ETCO2 (mmHg)    10/07/22 1126 92 humidified;high-flow nasal cannula 6 -- --    10/07/22 0814 90 -- -- -- --    10/07/22 0806 92 humidified;high-flow nasal cannula 6 -- --    10/07/22 0745 -- high-flow nasal cannula;humidified 6 -- --    10/07/22 0246 95 -- -- -- --    10/07/22 0243 96 high-flow nasal cannula 6 -- --    10/07/22 0043 -- high-flow nasal cannula;humidified 6 -- --    10/06/22 2344 92 high-flow nasal cannula 4 -- --    10/06/22 2322 94 -- -- -- --    10/06/22 2319 90 high-flow nasal cannula 4 -- --    10/06/22 2000 -- high-flow nasal cannula 4 -- --    10/06/22 1934 93 high-flow nasal cannula 4 -- --    10/06/22 1913 96 -- -- -- --    10/06/22 1907 95 high-flow nasal cannula 4 -- --    10/06/22 1539 92 humidified;high-flow nasal cannula 8 -- --    10/06/22 1300 91 -- -- -- --    10/06/22 1238 93 humidified;high-flow nasal cannula -- -- --    10/06/22 0846 94 -- -- -- --    10/06/22 0817 93 humidified;high-flow nasal cannula 8 -- --    10/06/22 0710 90 -- 8 -- --    10/06/22 0315 89 humidified;high-flow nasal cannula 8 -- --    10/06/22 0027 96 NPPV/NIV;nasal cannula 4 -- --    10/06/22 0000 -- humidified;nasal cannula;NPPV/NIV 4 -- --    10/05/22 2303 85 -- -- -- --    10/05/22 2000 -- nasal cannula 4 -- --    10/05/22 1918 90 NPPV/NIV;nasal cannula -- -- --    10/05/22 1815 90 nasal cannula -- -- --    10/05/22 1523 93 nasal cannula 4 -- --    10/05/22 1241 91 -- -- -- --    10/05/22 0755 -- -- 0 -- --    10/05/22 0739 94 -- -- -- --    10/05/22 0636 91 -- -- -- --    10/05/22 0605 93 -- -- -- --    10/05/22 0534 -- nasal cannula 2 -- --    10/05/22 0525 91 -- -- -- --    10/05/22 0521 85 -- -- -- --    10/05/22 0520 84 -- -- -- --    10/05/22 0506 95 -- -- -- --    10/05/22 0432 -- room air -- -- --     10/05/22 0428 -- room air -- -- --    10/05/22 0406 91 -- -- -- --    10/05/22 0355 -- room air -- -- --    10/05/22 0306 90 -- -- -- --    10/05/22 0254 -- nasal cannula 2 -- --    10/05/22 0249 -- nasal cannula 2 -- --    10/05/22 02:37:05 93 nasal cannula 2 -- --           Physician Progress Notes (last 48 hours)      Elais Villarreal MD at 10/07/22 1420              [facility]     LOS: 1 day     Subjective:    CC:Shortness of Breath    DIET:  Diet Order   Procedures   • Diet Regular   breathing is improved slightly    No cp  No n/v/d    Objective:    Vital Signs:  Temp:  [97.1 °F (36.2 °C)-97.4 °F (36.3 °C)] 97.4 °F (36.3 °C)  Heart Rate:  [] 100  Resp:  [18] 18  BP: (101-136)/(82-97) 136/97    SpO2:  [90 %-96 %] 92 %  on  Flow (L/min):  [4-8] 6;   Device (Oxygen Therapy): humidified;high-flow nasal cannula  Body mass index is 29.51 kg/m².    Physical Exam  Constitutional:       General: He is not in acute distress.     Appearance: He is not ill-appearing.   HENT:      Head: Normocephalic and atraumatic.   Cardiovascular:      Rate and Rhythm: Normal rate and regular rhythm.   Pulmonary:      Effort: No respiratory distress.      Breath sounds: No stridor. Wheezing present.      Comments: Breathing is improved, markedly prolonged exp phase  Musculoskeletal:      Right lower leg: No edema.      Left lower leg: No edema.   Neurological:      General: No focal deficit present.      Mental Status: He is oriented to person, place, and time.   Psychiatric:         Mood and Affect: Mood normal.         Behavior: Behavior normal.         Results Review:    Glucose   Date Value Ref Range Status   10/07/2022 128 (H) 65 - 99 mg/dL Final   10/06/2022 151 (H) 65 - 99 mg/dL Final   10/05/2022 98 65 - 99 mg/dL Final     Results from last 7 days   Lab Units 10/07/22  0507   WBC 10*3/mm3 21.05*   HEMOGLOBIN g/dL 15.1   HEMATOCRIT % 44.3   PLATELETS 10*3/mm3 293     Results from last 7 days   Lab  Units 10/07/22  0507   SODIUM mmol/L 139   POTASSIUM mmol/L 4.5   CHLORIDE mmol/L 104   CO2 mmol/L 24.8   BUN mg/dL 24*   CREATININE mg/dL 1.01   CALCIUM mg/dL 9.1   BILIRUBIN mg/dL 0.4   ALK PHOS U/L 70   ALT (SGPT) U/L 24   AST (SGOT) U/L 18   GLUCOSE mg/dL 128*     Results from last 7 days   Lab Units 10/06/22  0535   INR  1.03     Results from last 7 days   Lab Units 10/05/22  0122   MAGNESIUM mg/dL 2.4     Results from last 7 days   Lab Units 10/05/22  0122   TROPONIN T ng/mL <0.010     Cultures:  No results found for: BLOODCX, URINECX, WOUNDCX, MRSACX, RESPCX, STOOLCX    I have reviewed daily medications and changes in CPOE    Scheduled meds  cetirizine, 5 mg, Oral, Daily  enoxaparin, 40 mg, Subcutaneous, Daily  guaiFENesin, 1,200 mg, Oral, Q12H  ipratropium-albuterol, 3 mL, Nebulization, Q4H - RT  methylPREDNISolone sodium succinate, 40 mg, Intravenous, Q8H  montelukast, 10 mg, Oral, Nightly  pantoprazole, 40 mg, Oral, Q AM  sodium chloride, 10 mL, Intravenous, Q12H           PRN meds  •  acetaminophen **OR** acetaminophen **OR** acetaminophen  •  calcium carbonate  •  hydrOXYzine  •  ondansetron **OR** ondansetron  •  sodium chloride        COVID-19 virus infection    Asthma    Hypoxia    Seasonal allergies    GENTRY (obstructive sleep apnea)        Assessment/Plan:  Asthma exacerbation for which patient is receiving IV steroids and mini nebs every 4 hours  -Patient has elevated eosinophils will need follow-up outpatient  -d/w pulmonary today  -hypoxemia is slightly improved  -Dimer negative and Pro-Flex normal     COVID-19- 2 weeks out from likely start of the infection so hold on any COVID-specific treatment. Continue with steroids, d/w pulmonary and they are in agreement    Hypoxic respiratory failure  -monitor    dvt ppx with lovneox          Elias Villarreal MD  10/07/22  14:20 EDT        Electronically signed by Elias Villarreal MD at 10/07/22 4329     Beka Segura MD at 10/07/22 4475                                       Beka Segura MD                          [phone]            Patient ID:    Name:  Vishal Hair    MRN:  [id number]    1975   47 y.o.  male            Patient Care Team:  System, Provider Not In as PCP - General    CC/ Reason for visit: Acute hypoxic respiratory failure, severe asthma exacerbation, COVID-19 infection,     Subjective: Pt seen and examined this AM. No acute overnight events noted. Doing better.  Still with significant oxygen needs.  Still in the bed.  States that he is feeling better.  Able to cough up some secretions.  Repeat chest x-ray reviewed    ROS: Denies any subjective fevers, syncope or presyncopal events, new neurological deficits, nausea or vomiting currently    Objective     Vital Signs past 24hrs    BP range: BP: (101-136)/(82-97) 136/97  Pulse range: Heart Rate:  [] 100  Resp rate range: Resp:  [18] 18  Temp range: Temp (24hrs), Av.2 °F (36.2 °C), Min:97.1 °F (36.2 °C), Max:97.4 °F (36.3 °C)      Ventilator/Non-Invasive Ventilation Settings (From admission, onward)            None          Vent Settings                                         Device (Oxygen Therapy): humidified;high-flow nasal cannula        ; Body mass index is 29.51 kg/m².      Intake/Output Summary (Last 24 hours) at 10/7/2022 1412  Last data filed at 10/7/2022 0814  Gross per 24 hour   Intake 440 ml   Output 600 ml   Net -160 ml       PHYSICAL EXAM   Constitutional: Middle-aged pt in bed, no acute respiratory distress, + accessory muscle use  Head: - NCAT  Eyes: No pallor, anicteric conjunctivae, EOMI.  ENMT:  Mallampati 3, no oral thrush. Moist MM.   NECK: Trachea midline, No thyromegaly, no palpable cervical lymphadenopathy  Heart: RRR, no murmur. No pedal edema   Lungs: SAMANTHA +, bilateral rhonchi diffuse wheezes. No crackles heard    Abdomen: Soft. No tenderness, guarding or rigidity. No palpable masses  Extremities: Extremities warm and well perfused. No  cyanosis/ clubbing  Neuro: Conscious, answers appropriately, no gross focal neuro deficits  Psych: Mood and affect -anxious    PPE recommended per [facility] infectious disease Isolation protocol for the current clinical scenario (as mentioned below) was followed.     Scheduled meds:  cetirizine, 5 mg, Oral, Daily  enoxaparin, 40 mg, Subcutaneous, Daily  guaiFENesin, 1,200 mg, Oral, Q12H  ipratropium-albuterol, 3 mL, Nebulization, Q4H - RT  methylPREDNISolone sodium succinate, 40 mg, Intravenous, Q8H  montelukast, 10 mg, Oral, Nightly  pantoprazole, 40 mg, Oral, Q AM  sodium chloride, 10 mL, Intravenous, Q12H        IV meds:                           Data Review:      Results from last 7 days   Lab Units 10/07/22  0507 10/06/22  0535 10/05/22  0122   SODIUM mmol/L 139 141 144   POTASSIUM mmol/L 4.5 4.8 4.4   CHLORIDE mmol/L 104 105 107   CO2 mmol/L 24.8 24.0 26.8   BUN mg/dL 24* 19 11   CREATININE mg/dL 1.01 1.04 1.03   CALCIUM mg/dL 9.1 9.6 9.5   BILIRUBIN mg/dL 0.4 0.3 0.3   ALK PHOS U/L 70 82 98   ALT (SGPT) U/L 24 24 29   AST (SGOT) U/L 18 24 28   GLUCOSE mg/dL 128* 151* 98   WBC 10*3/mm3 21.05* 23.31* 12.67*   HEMOGLOBIN g/dL 15.1 16.1 16.3   PLATELETS 10*3/mm3 293 351 322   INR   --  1.03  --    PROBNP pg/mL  --   --  22.0   PROCALCITONIN ng/mL  --   --  0.04       Lab Results   Component Value Date    CALCIUM 9.1 10/07/2022    PHOS 2.0 (L) 02/28/2020                    I have personally reviewed the results from the time of this admission to 10/7/2022 14:12 EDT and agree with these findings:  [x]  Laboratory  [x]  Microbiology  [x]  Radiology  []  EKG/Telemetry   [x]  Cardiology/Vascular   []  Pathology  []  Old records    Assessment    Acute hypoxic respiratory failure  Bibasilar atelectasis  Acute asthma exacerbation; severe  Acute COVID-19 infection  Severe eosinophila  Previous smoker; quit 6 months back GENTRY on sleep     RECOMMENDATIONS:  Patient stable from a respiratory standpoint.  Still with  significant oxygen needs likely from atelectasis based on chest x-ray and poor secretion clearance in the setting of severe asthma.  We will add mucus clearance techniques and recommended him to be sitting up in a chair and not be in the bed  Continue with steroids and bronchodilators for asthma  Ongoing treatment for COVID-19 infection per primary  Continue with home CPAP  Guarded prognosis    DVT prophylaxis: Lovenox    Will need to be discharged on long-term inhalers and will need outpatient follow-up for his severe asthma and frequent respiratory exacerbations.    Beka Segura MD  10/7/2022    Electronically signed by Beka Segura MD at 10/07/22 1415     Elias Villarreal MD at 10/06/22 1706              [facility]     LOS: 0 days     Subjective:    CC:Shortness of Breath    DIET:  Diet Order   Procedures   • Diet Regular   soa is stable, some cough    No cp  No n/v/d    Objective:    Vital Signs:  Temp:  [97 °F (36.1 °C)-98.9 °F (37.2 °C)] 97.3 °F (36.3 °C)  Heart Rate:  [] 94  Resp:  [18-24] 18  BP: (121-155)/(70-94) 139/70    SpO2:  [85 %-96 %] 92 %  on  Flow (L/min):  [4-8] 8;   Device (Oxygen Therapy): humidified;high-flow nasal cannula  Body mass index is 29.51 kg/m².    Physical Exam  Constitutional:       General: He is not in acute distress.     Appearance: He is not ill-appearing.   HENT:      Head: Normocephalic and atraumatic.   Cardiovascular:      Rate and Rhythm: Normal rate and regular rhythm.   Pulmonary:      Effort: Respiratory distress present.      Breath sounds: No stridor. Wheezing present.   Musculoskeletal:      Right lower leg: No edema.      Left lower leg: No edema.   Neurological:      General: No focal deficit present.      Mental Status: He is oriented to person, place, and time.   Psychiatric:         Mood and Affect: Mood normal.         Behavior: Behavior normal.         Results Review:    Glucose   Date Value Ref Range  Status   10/06/2022 151 (H) 65 - 99 mg/dL Final   10/05/2022 98 65 - 99 mg/dL Final     Results from last 7 days   Lab Units 10/06/22  0535   WBC 10*3/mm3 23.31*   HEMOGLOBIN g/dL 16.1   HEMATOCRIT % 48.3   PLATELETS 10*3/mm3 351     Results from last 7 days   Lab Units 10/06/22  0535   SODIUM mmol/L 141   POTASSIUM mmol/L 4.8   CHLORIDE mmol/L 105   CO2 mmol/L 24.0   BUN mg/dL 19   CREATININE mg/dL 1.04   CALCIUM mg/dL 9.6   BILIRUBIN mg/dL 0.3   ALK PHOS U/L 82   ALT (SGPT) U/L 24   AST (SGOT) U/L 24   GLUCOSE mg/dL 151*     Results from last 7 days   Lab Units 10/06/22  0535   INR  1.03     Results from last 7 days   Lab Units 10/05/22  0122   MAGNESIUM mg/dL 2.4     Results from last 7 days   Lab Units 10/05/22  0122   TROPONIN T ng/mL <0.010     Cultures:  No results found for: BLOODCX, URINECX, WOUNDCX, MRSACX, RESPCX, STOOLCX    I have reviewed daily medications and changes in CPOE    Scheduled meds  cetirizine, 5 mg, Oral, Daily  enoxaparin, 40 mg, Subcutaneous, Daily  ipratropium-albuterol, 3 mL, Nebulization, Q4H - RT  methylPREDNISolone sodium succinate, 60 mg, Intravenous, Q8H  sodium chloride, 10 mL, Intravenous, Q12H           PRN meds  •  acetaminophen **OR** acetaminophen **OR** acetaminophen  •  calcium carbonate  •  hydrOXYzine  •  ondansetron **OR** ondansetron  •  sodium chloride        COVID-19 virus infection    Asthma    Hypoxia    Seasonal allergies    GENTRY (obstructive sleep apnea)        Assessment/Plan:  Asthma exacerbation for which patient is receiving IV steroids and mini nebs every 4 hours  -Patient has elevated eosinophils will need follow-up outpatient  -Pulmonary consultation  -Worsening hypoxemia overnight stable during the day today.  -Dimer negative and Pro-Flex normal     COVID-19- 2 weeks out from likely start of the infection so hold on any COVID-specific treatment.    Hypoxic respiratory failure  -monitor            Elias Villarreal MD  10/06/22  17:06  EDT        Electronically signed by Elias Villarreal MD at 10/07/22 1423          Consult Notes (last 48 hours)      Beka Segura MD at 10/06/22 1858      Consult Orders    1. Inpatient Pulmonology Consult [[id number]] ordered by Elias Villarreal MD at 10/06/22 1703               Group: [facility]         CONSULT NOTE    Patient Identification:    Vishal Hair  47 y.o.  male  1975  [id number]            Patient Care Team:  System, Provider Not In as PCP - General    Requesting physician: Dr. Villarreal    Reason for Consultation: Acute respiratory failure, COVID-19 infection, asthma exacerbation    CC: Cough and shortness of breath    History of Present Illness: 47-year-old white male with previous smoker quit 6 months back with a past medical history significant for severe asthma with recurrent respiratory exacerbations who reportedly was at baseline health until a few days back until he started having some upper respiratory symptoms including congestion and coughing and states that he does not have a daily inhaler and uses only rescue inhaler and feels like he was unable to take it anymore and decided to come to the ER where he was noted to be severely hypoxemic with sats in the low 80s.  Chest x-ray did not show any acute concerns.  Testing showed that he was positive for COVID-19 and was treated  with steroids and bronchodilators for asthma exacerbation and admitted to the hospital.  We have been asked to assist in the management of the patient.    Patient is very anxious during my evaluation and sats are in the mid 90s but on high flow nasal cannula does have some rhonchi and copious wheezing bilaterally states that he is not better yet but improved since he came in states that he does not have a pulmonologist.  Does not use a daily inhaler as he was not aware.  Follows primary care.  Does not use oxygen at home.  Does have a recent diagnosis of sleep apnea started on CPAP    I  have reviewed the H&P as well as the notes from the other consultants taking care of the patient this admission as well as previous hospital notes (if any available) from our group physicians and summarized above      Objective     Review of Systems:  Unable to obtain more than about due to shortness of breath    Past Medical History:  Past Medical History:   Diagnosis Date   • Asthma    • Hypoxia 10/5/2022   • GENTRY (obstructive sleep apnea) 10/5/2022       Past Surgical History:  History reviewed. No pertinent surgical history.     Home Meds:  Medications Prior to Admission   Medication Sig Dispense Refill Last Dose   • albuterol sulfate  (90 Base) MCG/ACT inhaler Inhale 2 puffs Every 4 (Four) Hours As Needed for Wheezing or Shortness of Air.   10/4/2022 at Unknown time   • cetirizine (zyrTEC) 5 MG tablet Take 5 mg by mouth Daily.   10/4/2022 at Unknown time   • fexofenadine-pseudoephedrine (ALLEGRA-D 24) 180-240 MG per 24 hr tablet Take 1 tablet by mouth Every Night.   10/4/2022 at Unknown time   • guaiFENesin (MUCINEX) 600 MG 12 hr tablet Take 1 tablet by mouth Every 12 (Twelve) Hours. 10 tablet 0 10/4/2022 at Unknown time   • ipratropium-albuterol (DUO-NEB) 0.5-2.5 mg/3 ml nebulizer Inhale 3 mL Every 4 (Four) Hours As Needed.   10/4/2022 at Unknown time   • loratadine (CLARITIN) 10 MG tablet Take 10 mg by mouth Every Night.   10/4/2022 at Unknown time       Allergies:  No Known Allergies    Social History:   Social History     Socioeconomic History   • Marital status:    Tobacco Use   • Smoking status: Former Smoker     Packs/day: 0.25     Years: 15.00     Pack years: 3.75     Types: Cigarettes     Start date: 1/1/2020   • Smokeless tobacco: Never Used   Substance and Sexual Activity   • Alcohol use: Never       Family History:  History reviewed. No pertinent family history.    Physical Exam:  /70 (BP Location: Left arm, Patient Position: Sitting)   Pulse 94   Temp 97.3 °F (36.3 °C) (Oral)   " Resp 18   Ht 175.3 cm (69\")   SpO2 92%   BMI 29.51 kg/m²    Body mass index is 29.51 kg/m². 92%      Vent Settings                                           Physical Exam     Constitutional: Middle-aged pt in bed, no acute respiratory distress, + accessory muscle use  Head: - NCAT  Eyes: No pallor, anicteric conjunctivae, EOMI.  ENMT:  Mallampati 3, no oral thrush. Moist MM.   NECK: Trachea midline, No thyromegaly, no palpable cervical lymphadenopathy  Heart: RRR, no murmur. No pedal edema   Lungs: SAMANTHA +, bilateral rhonchi diffuse wheezes. No crackles heard    Abdomen: Soft. No tenderness, guarding or rigidity. No palpable masses  Extremities: Extremities warm and well perfused. No cyanosis/ clubbing  Neuro: Conscious, answers appropriately, no gross focal neuro deficits  Psych: Mood and affect -anxious    PPE recommended per [facility] infectious disease Isolation protocol for the current clinical scenario (as mentioned below) was followed.     LABS:  Results from last 7 days   Lab Units 10/06/22  0535 10/05/22  0122   SODIUM mmol/L 141 144   POTASSIUM mmol/L 4.8 4.4   CHLORIDE mmol/L 105 107   CO2 mmol/L 24.0 26.8   BUN mg/dL 19 11   CREATININE mg/dL 1.04 1.03   CALCIUM mg/dL 9.6 9.5   BILIRUBIN mg/dL 0.3 0.3   ALK PHOS U/L 82 98   ALT (SGPT) U/L 24 29   AST (SGOT) U/L 24 28   GLUCOSE mg/dL 151* 98   WBC 10*3/mm3 23.31* 12.67*   HEMOGLOBIN g/dL 16.1 16.3   PLATELETS 10*3/mm3 351 322   INR  1.03  --    PROBNP pg/mL  --  22.0   PROCALCITONIN ng/mL  --  0.04       Lab Results   Component Value Date    CALCIUM 9.6 10/06/2022    PHOS 2.0 (L) 02/28/2020           COVID LABS:  Results From Last 14 Days   Lab Units 10/06/22  0535 10/05/22  1303 10/05/22  0122   PROBNP pg/mL  --   --  22.0   CRP mg/dL 0.33 0.47  --    D DIMER QUANT MCGFEU/mL  --  <0.27  --    PROCALCITONIN ng/mL  --   --  0.04   PROTIME Seconds 13.4  --   --    INR  1.03  --   --    TROPONIN T ng/mL  --   --  <0.010            Result Review  "     I have personally reviewed the results from the time of this admission to 10/6/2022 18:58 EDT and agree with these findings:  [x]  Laboratory  [x]  Microbiology  [x]  Radiology  []  EKG/Telemetry   [x]  Cardiology/Vascular   []  Pathology  []  Old records  []  Other:    Assessment   Acute hypoxic respiratory failure  Acute asthma exacerbation  Acute COVID-19 infection  Previous smoker; quit 6 months back GENTRY on sleep      RECOMMENDATIONS:  Patient acute respiratory failure is likely multifactorial from COVID-19 infection and acute asthma exacerbation in the setting of lack of daily maintenance inhalers and severe respiratory issues with baseline asthma poorly controlled.  Agree with current treatment plan for COVID-19 infection  Will continue with steroids and bronchodilators  Unclear etiology for worsening oxygen needs potentially from atelectasis based on physical exam but we will repeat a chest x-ray.  Procalcitonin has been negative yesterday.  D-dimer is negative as well ruling out other possible etiologies  Will get urine drug screen as well  Continue with home CPAP  Guarded prognosis  DVT prophylaxis: Lovenox    CODE STATUS:    Code Status and Medical Interventions:   Ordered at: 10/05/22 0602     Code Status (Patient has no pulse and is not breathing):    CPR (Attempt to Resuscitate)     Medical Interventions (Patient has pulse or is breathing):    Full Support       Thank you for letting me participate in the care of this pleasant patient.  I have discussed my findings and recommendations with patient.     Beka Segura MD  10/6/2022  18:58 EDT      Electronically signed by Beka Segura MD at 10/06/22 190

## 2022-10-08 ENCOUNTER — READMISSION MANAGEMENT (OUTPATIENT)
Dept: CALL CENTER | Facility: HOSPITAL | Age: 47
End: 2022-10-08

## 2022-10-08 VITALS
BODY MASS INDEX: 32.22 KG/M2 | HEART RATE: 106 BPM | HEIGHT: 69 IN | DIASTOLIC BLOOD PRESSURE: 85 MMHG | WEIGHT: 217.5 LBS | SYSTOLIC BLOOD PRESSURE: 125 MMHG | RESPIRATION RATE: 20 BRPM | OXYGEN SATURATION: 93 % | TEMPERATURE: 98.9 F

## 2022-10-08 PROBLEM — D89.831 CYTOKINE RELEASE SYNDROME, GRADE 1: Status: ACTIVE | Noted: 2022-10-08

## 2022-10-08 LAB
ALBUMIN SERPL-MCNC: 3.7 G/DL (ref 3.5–5.2)
ALBUMIN/GLOB SERPL: 1.9 G/DL
ALP SERPL-CCNC: 91 U/L (ref 39–117)
ALT SERPL W P-5'-P-CCNC: 26 U/L (ref 1–41)
ANION GAP SERPL CALCULATED.3IONS-SCNC: 12.6 MMOL/L (ref 5–15)
AST SERPL-CCNC: 21 U/L (ref 1–40)
BASOPHILS # BLD AUTO: 0.05 10*3/MM3 (ref 0–0.2)
BASOPHILS NFR BLD AUTO: 0.3 % (ref 0–1.5)
BILIRUB SERPL-MCNC: 0.2 MG/DL (ref 0–1.2)
BUN SERPL-MCNC: 16 MG/DL (ref 6–20)
BUN/CREAT SERPL: 16.2 (ref 7–25)
CALCIUM SPEC-SCNC: 8.9 MG/DL (ref 8.6–10.5)
CHLORIDE SERPL-SCNC: 102 MMOL/L (ref 98–107)
CO2 SERPL-SCNC: 22.4 MMOL/L (ref 22–29)
CREAT SERPL-MCNC: 0.99 MG/DL (ref 0.76–1.27)
CRP SERPL-MCNC: <0.3 MG/DL (ref 0–0.5)
DEPRECATED RDW RBC AUTO: 41.2 FL (ref 37–54)
EGFRCR SERPLBLD CKD-EPI 2021: 95.7 ML/MIN/1.73
EOSINOPHIL # BLD AUTO: 0 10*3/MM3 (ref 0–0.4)
EOSINOPHIL NFR BLD AUTO: 0 % (ref 0.3–6.2)
ERYTHROCYTE [DISTWIDTH] IN BLOOD BY AUTOMATED COUNT: 13.3 % (ref 12.3–15.4)
GLOBULIN UR ELPH-MCNC: 2 GM/DL
GLUCOSE SERPL-MCNC: 153 MG/DL (ref 65–99)
HCT VFR BLD AUTO: 44 % (ref 37.5–51)
HGB BLD-MCNC: 15.1 G/DL (ref 13–17.7)
IMM GRANULOCYTES # BLD AUTO: 0.44 10*3/MM3 (ref 0–0.05)
IMM GRANULOCYTES NFR BLD AUTO: 2.4 % (ref 0–0.5)
INR PPP: 0.87 (ref 0.9–1.1)
LYMPHOCYTES # BLD AUTO: 1.06 10*3/MM3 (ref 0.7–3.1)
LYMPHOCYTES NFR BLD AUTO: 5.7 % (ref 19.6–45.3)
MCH RBC QN AUTO: 29 PG (ref 26.6–33)
MCHC RBC AUTO-ENTMCNC: 34.3 G/DL (ref 31.5–35.7)
MCV RBC AUTO: 84.5 FL (ref 79–97)
MONOCYTES # BLD AUTO: 1.7 10*3/MM3 (ref 0.1–0.9)
MONOCYTES NFR BLD AUTO: 9.1 % (ref 5–12)
NEUTROPHILS NFR BLD AUTO: 15.34 10*3/MM3 (ref 1.7–7)
NEUTROPHILS NFR BLD AUTO: 82.5 % (ref 42.7–76)
NRBC BLD AUTO-RTO: 0.1 /100 WBC (ref 0–0.2)
PLATELET # BLD AUTO: 305 10*3/MM3 (ref 140–450)
PMV BLD AUTO: 10 FL (ref 6–12)
POTASSIUM SERPL-SCNC: 3.9 MMOL/L (ref 3.5–5.2)
PROT SERPL-MCNC: 5.7 G/DL (ref 6–8.5)
PROTHROMBIN TIME: 12 SECONDS (ref 11.7–14.2)
RBC # BLD AUTO: 5.21 10*6/MM3 (ref 4.14–5.8)
SODIUM SERPL-SCNC: 137 MMOL/L (ref 136–145)
WBC NRBC COR # BLD: 18.59 10*3/MM3 (ref 3.4–10.8)

## 2022-10-08 PROCEDURE — 25010000002 ENOXAPARIN PER 10 MG: Performed by: NURSE PRACTITIONER

## 2022-10-08 PROCEDURE — 85025 COMPLETE CBC W/AUTO DIFF WBC: CPT | Performed by: NURSE PRACTITIONER

## 2022-10-08 PROCEDURE — 94799 UNLISTED PULMONARY SVC/PX: CPT

## 2022-10-08 PROCEDURE — 94664 DEMO&/EVAL PT USE INHALER: CPT

## 2022-10-08 PROCEDURE — 85610 PROTHROMBIN TIME: CPT | Performed by: NURSE PRACTITIONER

## 2022-10-08 PROCEDURE — 86140 C-REACTIVE PROTEIN: CPT | Performed by: NURSE PRACTITIONER

## 2022-10-08 PROCEDURE — 36415 COLL VENOUS BLD VENIPUNCTURE: CPT | Performed by: NURSE PRACTITIONER

## 2022-10-08 PROCEDURE — 94761 N-INVAS EAR/PLS OXIMETRY MLT: CPT

## 2022-10-08 PROCEDURE — 80053 COMPREHEN METABOLIC PANEL: CPT | Performed by: NURSE PRACTITIONER

## 2022-10-08 PROCEDURE — 25010000002 METHYLPREDNISOLONE PER 40 MG: Performed by: INTERNAL MEDICINE

## 2022-10-08 PROCEDURE — 94760 N-INVAS EAR/PLS OXIMETRY 1: CPT

## 2022-10-08 RX ORDER — BUDESONIDE AND FORMOTEROL FUMARATE DIHYDRATE 160; 4.5 UG/1; UG/1
2 AEROSOL RESPIRATORY (INHALATION) 2 TIMES DAILY
Qty: 10.2 G | Refills: 3 | Status: SHIPPED | OUTPATIENT
Start: 2022-10-08

## 2022-10-08 RX ORDER — GUAIFENESIN 600 MG/1
1200 TABLET, EXTENDED RELEASE ORAL EVERY 12 HOURS PRN
Qty: 120 TABLET | Refills: 0 | Status: SHIPPED | OUTPATIENT
Start: 2022-10-08 | End: 2022-11-07

## 2022-10-08 RX ORDER — PREDNISONE 10 MG/1
TABLET ORAL
Qty: 12 TABLET | Refills: 0 | Status: SHIPPED | OUTPATIENT
Start: 2022-10-08 | End: 2022-10-14

## 2022-10-08 RX ADMIN — PANTOPRAZOLE SODIUM 40 MG: 40 TABLET, DELAYED RELEASE ORAL at 06:00

## 2022-10-08 RX ADMIN — CETIRIZINE HYDROCHLORIDE 5 MG: 10 TABLET ORAL at 08:40

## 2022-10-08 RX ADMIN — ENOXAPARIN SODIUM 40 MG: 100 INJECTION SUBCUTANEOUS at 08:40

## 2022-10-08 RX ADMIN — Medication 10 ML: at 08:42

## 2022-10-08 RX ADMIN — METHYLPREDNISOLONE SODIUM SUCCINATE 40 MG: 40 INJECTION, POWDER, FOR SOLUTION INTRAMUSCULAR; INTRAVENOUS at 12:19

## 2022-10-08 RX ADMIN — GUAIFENESIN 1200 MG: 600 TABLET, EXTENDED RELEASE ORAL at 08:40

## 2022-10-08 RX ADMIN — IPRATROPIUM BROMIDE AND ALBUTEROL SULFATE 3 ML: .5; 3 SOLUTION RESPIRATORY (INHALATION) at 07:38

## 2022-10-08 RX ADMIN — IPRATROPIUM BROMIDE AND ALBUTEROL SULFATE 3 ML: .5; 3 SOLUTION RESPIRATORY (INHALATION) at 15:12

## 2022-10-08 RX ADMIN — METHYLPREDNISOLONE SODIUM SUCCINATE 40 MG: 40 INJECTION, POWDER, FOR SOLUTION INTRAMUSCULAR; INTRAVENOUS at 04:00

## 2022-10-08 RX ADMIN — IPRATROPIUM BROMIDE AND ALBUTEROL SULFATE 3 ML: .5; 3 SOLUTION RESPIRATORY (INHALATION) at 11:04

## 2022-10-08 NOTE — DISCHARGE SUMMARY
[facility]  [phone]    DISCHARGE SUMMARY  [facility]    Patient Identification:  Name: Vishal Hair  Age: 47 y.o.  Sex: male  :  1975  MRN: [id number]  Primary Care Physician: System, Provider Not In    Admit date: 10/5/2022  Discharge date and time:      Discharge Diagnoses:  COVID-19 virus infection    Asthma    Hypoxia    Seasonal allergies    GENTRY (obstructive sleep apnea)    Cytokine release syndrome, grade 1       History of present illness from H&P:  Vishal Hair is a 47 y.o. male w/PMH of asthma  who presented to [facility]  on 10/5/2022 complaining of two week history cough, congestion and worsening shortness of breath. Patient reports that he has asthma and thought he was having a worse than normal flair after recently mowing several lawns. He states he went to the urgent care yesterday for evaluation, and to ask for oral steroids because they have been very helpful in the past. His oxygen saturation was reported to be 84% so he was given a breathing treatment and advised to report to the emergency department for further treatment. Patient also reports recently being started on CPAP for GENTRY.     Hospital Course:     Patient was admitted to the hospital and has marked prolonged expiratory phase with wheezing.  He had significant hypoxemia requiring 8 L of oxygen.  He has now been weaned down to room air.  Today's 91 to 92% on room air.  Is been seen by pulmonary they are okay with the patient being discharged.  His ambulatory saturations are above 92%.  Patient is not in any distress and is extremely anxious for discharge.  On discharge patient will leave on Symbicort and use mini nebs regularly for the next few days at home and then as needed.  I have given the patient a prescription for prednisone.  Pulmonary does not feel necessarily needs them but if the patient is becoming more short of air over the next couple days he can  start on the prednisone 20 mg x 3 days then 10 mg x 3 days.  Recommend the patient continue to use flutter valve and also I recommend patient be trained with peak flow and no levels at which to return to the emergency room.  I have asked respiratory to see the patient about this.    Patient had a COVID but started have symptoms approximately 2 weeks prior to hospitalization so not a candidate for any antiviral treatment.    Patient was noted to be eosinophilia on admission and so he may be a candidate for newer treatments for asthma he can follow this up with the pulmonary team outpatient.        The patient was seen and examined on the day of discharge.    Consults:   Consults     Date and Time Order Name Status Description    10/6/2022  5:05 PM Inpatient Pulmonology Consult Completed     10/6/2022  3:14 AM Inpatient Pulmonology Consult Completed     10/5/2022  5:20 AM [facility] (on-call MD unless specified) Details            Results from last 7 days   Lab Units 10/08/22  0500   WBC 10*3/mm3 18.59*   HEMOGLOBIN g/dL 15.1   HEMATOCRIT % 44.0   PLATELETS 10*3/mm3 305       Results from last 7 days   Lab Units 10/08/22  0500   SODIUM mmol/L 137   POTASSIUM mmol/L 3.9   CHLORIDE mmol/L 102   CO2 mmol/L 22.4   BUN mg/dL 16   CREATININE mg/dL 0.99   GLUCOSE mg/dL 153*   CALCIUM mg/dL 8.9       Significant Diagnostic Studies:   WBC   Date Value Ref Range Status   10/08/2022 18.59 (H) 3.40 - 10.80 10*3/mm3 Final     Hemoglobin   Date Value Ref Range Status   10/08/2022 15.1 13.0 - 17.7 g/dL Final     Hematocrit   Date Value Ref Range Status   10/08/2022 44.0 37.5 - 51.0 % Final     Platelets   Date Value Ref Range Status   10/08/2022 305 140 - 450 10*3/mm3 Final     Sodium   Date Value Ref Range Status   10/08/2022 137 136 - 145 mmol/L Final     Potassium   Date Value Ref Range Status   10/08/2022 3.9 3.5 - 5.2 mmol/L Final     Chloride   Date Value Ref Range Status   10/08/2022 102 98 - 107 mmol/L Final     CO2   Date Value  Ref Range Status   10/08/2022 22.4 22.0 - 29.0 mmol/L Final     BUN   Date Value Ref Range Status   10/08/2022 16 6 - 20 mg/dL Final     Creatinine   Date Value Ref Range Status   10/08/2022 0.99 0.76 - 1.27 mg/dL Final     Glucose   Date Value Ref Range Status   10/08/2022 153 (H) 65 - 99 mg/dL Final     Calcium   Date Value Ref Range Status   10/08/2022 8.9 8.6 - 10.5 mg/dL Final     AST (SGOT)   Date Value Ref Range Status   10/08/2022 21 1 - 40 U/L Final     ALT (SGPT)   Date Value Ref Range Status   10/08/2022 26 1 - 41 U/L Final     Alkaline Phosphatase   Date Value Ref Range Status   10/08/2022 91 39 - 117 U/L Final     INR   Date Value Ref Range Status   10/08/2022 0.87 (L) 0.90 - 1.10 Final     No results found for: COLORU, CLARITYU, SPECGRAV, PHUR, PROTEINUR, GLUCOSEU, KETONESU, BLOODU, NITRITE, LEUKOCYTESUR, BILIRUBINUR, UROBILINOGEN, RBCUA, WBCUA, BACTERIA, UACOMMENT  No results found for: TROPONINT, TROPONINI, BNP  No components found for: HGBA1C;2  No components found for: TSH;2    Imaging Results (All)     Procedure Component Value Units Date/Time    XR Chest 1 View [[id number]] Collected: 10/06/22 2059     Updated: 10/06/22 2104    Narrative:      XR CHEST 1 VW-     HISTORY: Male who is 47 years-old,  respiratory failure, follow-up     TECHNIQUE: Frontal views of the chest     COMPARISON: 10/5/2022     FINDINGS: Heart, mediastinum and pulmonary vasculature are unremarkable.  Minimal atelectasis or infiltrate at the bases. No focal pulmonary  consolidation, pleural effusion, or pneumothorax. No acute osseous  process.       Impression:      Minimal atelectasis or infiltrate at the bases.     This report was finalized on 10/6/2022 9:01 PM by Dr. Garfield Monae M.D.       XR Chest 1 View [[id number]] Collected: 10/05/22 0051     Updated: 10/05/22 0055    Narrative:      SINGLE VIEW OF THE CHEST     HISTORY: Shortness of air     COMPARISON: 02/27/2020     FINDINGS:  Heart size is within normal limits.  No pneumothorax, pleural effusion,  or acute infiltrate is seen. There is an old right-sided rib fracture.       Impression:      No acute findings.     This report was finalized on 10/5/2022 12:52 AM by Dr. Griselda Edmonds M.D.         No results found for: SITE, ALLENTEST, PHART, ZGW0HOE, PO2ART, FNP1QRN, BASEEXCESS, J5UWZWUL, HGBBG, HCTABG, OXYHEMOGLOBI, METHHGBN, CARBOXYHGB, CO2CT, BAROMETRIC, MODALITY, FIO2       Discharge Medications      New Medications      Instructions Start Date   budesonide-formoterol 160-4.5 MCG/ACT inhaler  Commonly known as: SYMBICORT   2 puffs, Inhalation, 2 Times Daily      predniSONE 10 MG (48) dose pack  Commonly known as: DELTASONE   30 mg qday  x3 days then 10 mg qday  3 days         Changes to Medications      Instructions Start Date   guaiFENesin 600 MG 12 hr tablet  Commonly known as: MUCINEX  What changed:   · how much to take  · when to take this  · reasons to take this   1,200 mg, Oral, Every 12 Hours PRN         Continue These Medications      Instructions Start Date   cetirizine 5 MG tablet  Commonly known as: zyrTEC   5 mg, Oral, Daily      ipratropium-albuterol 0.5-2.5 mg/3 ml nebulizer  Commonly known as: DUO-NEB   3 mL, Inhalation, Every 4 Hours PRN         Stop These Medications    albuterol sulfate  (90 Base) MCG/ACT inhaler  Commonly known as: PROVENTIL HFA;VENTOLIN HFA;PROAIR HFA     fexofenadine-pseudoephedrine 180-240 MG per 24 hr tablet  Commonly known as: ALLEGRA-D 24     loratadine 10 MG tablet  Commonly known as: CLARITIN              Patient Instructions:       No future appointments.      Follow-up Information     System, Provider Not In .    Contact information:  [facility] [id number]             System, Provider Not In Follow up in 1 week(s).    Contact information:  [facility] [id number]             Jak Hager MD Follow up in 1 month(s).    Specialty: Pulmonary Disease  Contact information:  [address] [address]  [phone]                         Discharge Order (From admission, onward)     Start     Ordered    10/08/22 1405  Discharge patient  Once        Expected Discharge Date: 10/08/22    Discharge Disposition: Home or Self Care    Physician of Record for Attribution - Please select from Treatment Team: ELIAS VILLARREAL [[id number]]    Review needed by CMO to determine Physician of Record: No       Question Answer Comment   Physician of Record for Attribution - Please select from Treatment Team ELIAS VILLARREAL    Review needed by CMO to determine Physician of Record No        10/08/22 1410                Diet Order   Procedures   • Diet Regular           Discharge instructions:  Follow up with your primary care provider in 1-2 weeks with a cbc and cmp         Total time spent discharging patient including evaluation, post hospitalization follow up,  medication and post hospitalization instructions and education, total time exceeds 30 minutes.    Signed:  Elias Villarreal MD  10/8/2022  14:15 EDT

## 2022-10-08 NOTE — PLAN OF CARE
Goal Outcome Evaluation:  Plan of Care Reviewed With: patient        Progress: improving  Outcome Evaluation: VSS. 3L NC. No changes throughout the night. Will CTM

## 2022-10-08 NOTE — PROGRESS NOTES
Dr. IOANA Hager    [address]    10/8/2022    Patient ID:  Name:  Vishal Hair  MRN:  [id number]  1975  47 y.o.  male            CC/Reason for visit:  Acute hypoxic respiratory failure, severe asthma exacerbation, COVID-19 infection    Interval hx: No shortness of breath at baseline.  Has been weaned off of oxygen this morning.  Wants to be discharged today.  Eating lunch without any complaints.  Has some mild cough    ROS: Some cough, no fever, no chills, no hemoptysis    I reviewed old medical records.  Past medical history, social history and family history: Unchanged from admission H&P.      Vitals:  Vitals:    10/08/22 0512 10/08/22 0739 10/08/22 0754 10/08/22 1104   BP:   125/85    BP Location:   Left arm    Patient Position:   Sitting    Pulse:       Resp:  20 20 20   Temp:   98.9 °F (37.2 °C)    TempSrc:   Oral    SpO2:       Weight: 98.7 kg (217 lb 8 oz)      Height:               Body mass index is 32.12 kg/m².    Intake/Output Summary (Last 24 hours) at 10/8/2022 1253  Last data filed at 10/8/2022 0754  Gross per 24 hour   Intake 610 ml   Output 1300 ml   Net -690 ml       Exam:  GEN:  No distress  Alert, oriented x 3.   LUNGS: A few coarse breath sounds but otherwise no wheezing bilat, no use of accessory muscles  CV:  Normal S1S2, without murmur, no edema  ABD:  Non tender, no enlarged liver or masses      Scheduled meds:  cetirizine, 5 mg, Oral, Daily  enoxaparin, 40 mg, Subcutaneous, Daily  guaiFENesin, 1,200 mg, Oral, Q12H  ipratropium-albuterol, 3 mL, Nebulization, Q4H - RT  methylPREDNISolone sodium succinate, 40 mg, Intravenous, Q8H  montelukast, 10 mg, Oral, Nightly  pantoprazole, 40 mg, Oral, Q AM  sodium chloride, 10 mL, Intravenous, Q12H      IV meds:                           Data Review:   I reviewed the patient's medications and new clinical results.    COVID19   Date Value Ref Range Status   10/05/2022 Detected (C) Not Detected - Ref. Range Final         Lab Results    Component Value Date    CALCIUM 8.9 10/08/2022    PHOS 2.0 (L) 02/28/2020    MG 2.4 10/05/2022     Results from last 7 days   Lab Units 10/08/22  0646 10/08/22  0500 10/07/22  0507 10/06/22  0535 10/05/22  0122   SODIUM mmol/L  --  137 139 141 144   POTASSIUM mmol/L  --  3.9 4.5 4.8 4.4   CHLORIDE mmol/L  --  102 104 105 107   CO2 mmol/L  --  22.4 24.8 24.0 26.8   BUN mg/dL  --  16 24* 19 11   CREATININE mg/dL  --  0.99 1.01 1.04 1.03   CALCIUM mg/dL  --  8.9 9.1 9.6 9.5   BILIRUBIN mg/dL  --  0.2 0.4 0.3 0.3   ALK PHOS U/L  --  91 70 82 98   ALT (SGPT) U/L  --  26 24 24 29   AST (SGOT) U/L  --  21 18 24 28   GLUCOSE mg/dL  --  153* 128* 151* 98   WBC 10*3/mm3  --  18.59* 21.05* 23.31* 12.67*   HEMOGLOBIN g/dL  --  15.1 15.1 16.1 16.3   PLATELETS 10*3/mm3  --  305 293 351 322   INR  0.87*  --   --  1.03  --    PROBNP pg/mL  --   --   --   --  22.0   PROCALCITONIN ng/mL  --   --   --   --  0.04           Results from last 7 days   Lab Units 10/08/22  0500 10/07/22  0507 10/06/22  0535 10/05/22  1303 10/05/22  0122 10/05/22  0122   AST (SGOT) U/L 21 18 24  --   --  28   ALT (SGPT) U/L 26 24 24  --   --  29   PROCALCITONIN ng/mL  --   --   --   --   --  0.04   D DIMER QUANT MCGFEU/mL  --  <0.27  --  <0.27  --   --    CRP mg/dL <0.30 <0.30 0.33 0.47   < >  --    PLATELETS 10*3/mm3 305 293 351  --   --  322    < > = values in this interval not displayed.           ASSESSMENT:     COVID-19 virus infection    Asthma exacerbation    Hypoxia    Seasonal allergies    GENTRY (obstructive sleep apnea)        PLAN:  He has responded very well to IV Solu-Medrol.  His bronchospasm has resolved.  He has a few coarse breath sounds but no active wheezing today.  He has been weaned off of oxygen.  Perform ambulatory oximetry today and consider discharging patient today.  He is vaccinated and his inflammatory markers are low.  This portends a good prognosis.  I do not think he needs to be discharged on oral corticosteroids.  I will  prescribe him Symbicort to take home for any post viral bronchial hyperresponsiveness.  He does not need to follow-up with us.  He will have to stay on Symbicort until he sees his primary care physician due to his history of asthma.  He should follow-up with his primary care physician within 1 to 2 weeks.  Please call us with any issues        Jak Hager MD  10/8/2022

## 2022-10-08 NOTE — DOWNTIME EVENT NOTE
The EMR was down for 3 hours on 10/8/2022.     was responsible for completing the paper charting during this time period.     The following information was re-entered into the system by Chenyl Hatchett, RN: MAR    The following information will remain in the paper chart: MAR Chenyl Hatchett, RN  10/8/2022

## 2022-10-09 ENCOUNTER — READMISSION MANAGEMENT (OUTPATIENT)
Dept: CALL CENTER | Facility: HOSPITAL | Age: 47
End: 2022-10-09

## 2022-10-09 NOTE — OUTREACH NOTE
Prep Survey    Flowsheet Row Responses   [facility] facility patient discharged from? [facility]   Is LACE score < 7 ? No   Emergency Room discharge w/ pulse ox? No   Eligibility Readm Mgmt   Discharge diagnosis COVID-19 virus infection   Does the patient have one of the following disease processes/diagnoses(primary or secondary)? COVID-19   Does the patient have Home health ordered? No   Is there a DME ordered? No   Prep survey completed? Yes          JULIETTE MOON - Registered Nurse

## 2022-10-09 NOTE — OUTREACH NOTE
COVID-19 Week 1 Survey    Flowsheet Row Responses   [facility] patient discharged from? [place]   Does the patient have one of the following disease processes/diagnoses(primary or secondary)? COVID-19   COVID-19 underlying condition? None   Call Number Call 1   Week 1 Call successful? No  [no answer]   Discharge diagnosis COVID-19 virus infection          DAVIAN MCMANUS - Registered Nurse

## 2022-10-10 NOTE — PAYOR COMM NOTE
"Quentin Rodríguez (47 y.o. Male)     PLEASE SEE ATTACHED FOR DC NOTICE    ID [id number]    THANK YOU  KASIE VINCENT RN/CCP  [phone]          Date of Birth   1975    Social Security Number       Address   [address] [id number]    Home Phone   [phone]    MRN   [id number]       Jainism   Tenriism    Marital Status                               Admission Date   10/5/22    Admission Type   Emergency    Admitting Provider   Jhonny Delcid MD    Attending Provider       Department, Room/Bed   [address], [address]/1       Discharge Date   10/8/2022    Discharge Disposition   Home or Self Care    Discharge Destination                               Attending Provider: (none)   Allergies: No Known Allergies    Isolation: None   Infection: COVID (confirmed) (10/05/22)   Code Status: Prior    Ht: 175.3 cm (69\")   Wt: 98.7 kg (217 lb 8 oz)    Admission Cmt: None   Principal Problem: COVID-19 virus infection [U07.1]                 Active Insurance as of 10/5/2022     Primary Coverage     Payor Plan Insurance Group Employer/Plan Group    Critical access hospital BLUE [facility] EMPLOYEE [id number]     Payor Plan Address Payor Plan Phone Number Payor Plan Fax Number Effective Dates    [id number] [phone]  2019 - None Entered    [facility] [id number]       Subscriber Name Subscriber Birth Date Member ID       QUENTIN RODRÍGUEZ 1975 [id number]                 Emergency Contacts      (Rel.) Home Phone Work Phone Mobile Phone    luis angel rodríguez (Spouse) -- -- [phone]            [place]: [facility] [id number]  Tax ID [id number]     Discharge Summary      Elias Villarreal MD at 10/08/22 [facility]                       [place] [facility]  [phone]    DISCHARGE SUMMARY  [facility]    Patient Identification:  Name: Quentin Rodríguez  Age: 47 y.o.  Sex: male  :  1975  MRN: [id number]  Primary Care Physician: " System, Provider Not In    Admit date: 10/5/2022  Discharge date and time:      Discharge Diagnoses:  COVID-19 virus infection    Asthma    Hypoxia    Seasonal allergies    GENTRY (obstructive sleep apnea)    Cytokine release syndrome, grade 1       History of present illness from H&P:  Vishal Hair is a 47 y.o. male w/PMH of asthma  who presented to [facility]  on 10/5/2022 complaining of two week history cough, congestion and worsening shortness of breath. Patient reports that he has asthma and thought he was having a worse than normal flair after recently mowing several lawns. He states he went to the urgent care yesterday for evaluation, and to ask for oral steroids because they have been very helpful in the past. His oxygen saturation was reported to be 84% so he was given a breathing treatment and advised to report to the emergency department for further treatment. Patient also reports recently being started on CPAP for GENTRY.     Hospital Course:     Patient was admitted to the hospital and has marked prolonged expiratory phase with wheezing.  He had significant hypoxemia requiring 8 L of oxygen.  He has now been weaned down to room air.  Today's 91 to 92% on room air.  Is been seen by pulmonary they are okay with the patient being discharged.  His ambulatory saturations are above 92%.  Patient is not in any distress and is extremely anxious for discharge.  On discharge patient will leave on Symbicort and use mini nebs regularly for the next few days at home and then as needed.  I have given the patient a prescription for prednisone.  Pulmonary does not feel necessarily needs them but if the patient is becoming more short of air over the next couple days he can start on the prednisone 20 mg x 3 days then 10 mg x 3 days.  Recommend the patient continue to use flutter valve and also I recommend patient be trained with peak flow and no levels at which to return to the emergency room.  I have asked  respiratory to see the patient about this.    Patient had a COVID but started have symptoms approximately 2 weeks prior to hospitalization so not a candidate for any antiviral treatment.    Patient was noted to be eosinophilia on admission and so he may be a candidate for newer treatments for asthma he can follow this up with the pulmonary team outpatient.        The patient was seen and examined on the day of discharge.    Consults:   Consults     Date and Time Order Name Status Description    10/6/2022  5:05 PM Inpatient Pulmonology Consult Completed     10/6/2022  3:14 AM Inpatient Pulmonology Consult Completed     10/5/2022  5:20 AM [facility] (on-call MD unless specified) Details            Results from last 7 days   Lab Units 10/08/22  0500   WBC 10*3/mm3 18.59*   HEMOGLOBIN g/dL 15.1   HEMATOCRIT % 44.0   PLATELETS 10*3/mm3 305       Results from last 7 days   Lab Units 10/08/22  0500   SODIUM mmol/L 137   POTASSIUM mmol/L 3.9   CHLORIDE mmol/L 102   CO2 mmol/L 22.4   BUN mg/dL 16   CREATININE mg/dL 0.99   GLUCOSE mg/dL 153*   CALCIUM mg/dL 8.9       Significant Diagnostic Studies:   WBC   Date Value Ref Range Status   10/08/2022 18.59 (H) 3.40 - 10.80 10*3/mm3 Final     Hemoglobin   Date Value Ref Range Status   10/08/2022 15.1 13.0 - 17.7 g/dL Final     Hematocrit   Date Value Ref Range Status   10/08/2022 44.0 37.5 - 51.0 % Final     Platelets   Date Value Ref Range Status   10/08/2022 305 140 - 450 10*3/mm3 Final     Sodium   Date Value Ref Range Status   10/08/2022 137 136 - 145 mmol/L Final     Potassium   Date Value Ref Range Status   10/08/2022 3.9 3.5 - 5.2 mmol/L Final     Chloride   Date Value Ref Range Status   10/08/2022 102 98 - 107 mmol/L Final     CO2   Date Value Ref Range Status   10/08/2022 22.4 22.0 - 29.0 mmol/L Final     BUN   Date Value Ref Range Status   10/08/2022 16 6 - 20 mg/dL Final     Creatinine   Date Value Ref Range Status   10/08/2022 0.99 0.76 - 1.27 mg/dL Final     Glucose   Date  Value Ref Range Status   10/08/2022 153 (H) 65 - 99 mg/dL Final     Calcium   Date Value Ref Range Status   10/08/2022 8.9 8.6 - 10.5 mg/dL Final     AST (SGOT)   Date Value Ref Range Status   10/08/2022 21 1 - 40 U/L Final     ALT (SGPT)   Date Value Ref Range Status   10/08/2022 26 1 - 41 U/L Final     Alkaline Phosphatase   Date Value Ref Range Status   10/08/2022 91 39 - 117 U/L Final     INR   Date Value Ref Range Status   10/08/2022 0.87 (L) 0.90 - 1.10 Final     No results found for: COLORU, CLARITYU, SPECGRAV, PHUR, PROTEINUR, GLUCOSEU, KETONESU, BLOODU, NITRITE, LEUKOCYTESUR, BILIRUBINUR, UROBILINOGEN, RBCUA, WBCUA, BACTERIA, UACOMMENT  No results found for: TROPONINT, TROPONINI, BNP  No components found for: HGBA1C;2  No components found for: TSH;2    Imaging Results (All)     Procedure Component Value Units Date/Time    XR Chest 1 View [[id number]] Collected: 10/06/22 2059     Updated: 10/06/22 2104    Narrative:      XR CHEST 1 VW-     HISTORY: Male who is 47 years-old,  respiratory failure, follow-up     TECHNIQUE: Frontal views of the chest     COMPARISON: 10/5/2022     FINDINGS: Heart, mediastinum and pulmonary vasculature are unremarkable.  Minimal atelectasis or infiltrate at the bases. No focal pulmonary  consolidation, pleural effusion, or pneumothorax. No acute osseous  process.       Impression:      Minimal atelectasis or infiltrate at the bases.     This report was finalized on 10/6/2022 9:01 PM by Dr. Garfield Monae M.D.       XR Chest 1 View [[id number]] Collected: 10/05/22 0051     Updated: 10/05/22 0055    Narrative:      SINGLE VIEW OF THE CHEST     HISTORY: Shortness of air     COMPARISON: 02/27/2020     FINDINGS:  Heart size is within normal limits. No pneumothorax, pleural effusion,  or acute infiltrate is seen. There is an old right-sided rib fracture.       Impression:      No acute findings.     This report was finalized on 10/5/2022 12:52 AM by Dr. Griselda Edmonds M.D.          No results found for: SITE, ALLENTEST, PHART, MTR9LEK, PO2ART, KXF6YDR, BASEEXCESS, W9AKDBPD, HGBBG, HCTABG, OXYHEMOGLOBI, METHHGBN, CARBOXYHGB, CO2CT, BAROMETRIC, MODALITY, FIO2       Discharge Medications      New Medications      Instructions Start Date   budesonide-formoterol 160-4.5 MCG/ACT inhaler  Commonly known as: SYMBICORT   2 puffs, Inhalation, 2 Times Daily      predniSONE 10 MG (48) dose pack  Commonly known as: DELTASONE   30 mg qday  x3 days then 10 mg qday  3 days         Changes to Medications      Instructions Start Date   guaiFENesin 600 MG 12 hr tablet  Commonly known as: MUCINEX  What changed:   · how much to take  · when to take this  · reasons to take this   1,200 mg, Oral, Every 12 Hours PRN         Continue These Medications      Instructions Start Date   cetirizine 5 MG tablet  Commonly known as: zyrTEC   5 mg, Oral, Daily      ipratropium-albuterol 0.5-2.5 mg/3 ml nebulizer  Commonly known as: DUO-NEB   3 mL, Inhalation, Every 4 Hours PRN         Stop These Medications    albuterol sulfate  (90 Base) MCG/ACT inhaler  Commonly known as: PROVENTIL HFA;VENTOLIN HFA;PROAIR HFA     fexofenadine-pseudoephedrine 180-240 MG per 24 hr tablet  Commonly known as: ALLEGRA-D 24     loratadine 10 MG tablet  Commonly known as: CLARITIN              Patient Instructions:       No future appointments.      Follow-up Information     System, Provider Not In .    Contact information:  [address] [address]             System, Provider Not In Follow up in 1 week(s).    Contact information:  [facility] [address]             Jak Hager MD Follow up in 1 month(s).    Specialty: Pulmonary Disease  Contact information:  [facility] [id number] [phone]                         Discharge Order (From admission, onward)     Start     Ordered    10/08/22 1405  Discharge patient  Once        Expected Discharge Date: 10/08/22    Discharge  Disposition: Home or Self Care    Physician of Record for Attribution - Please select from Treatment Team: ELIAS VILLARREAL [[id number]]    Review needed by CMO to determine Physician of Record: No       Question Answer Comment   Physician of Record for Attribution - Please select from Treatment Team ELIAS VILLARREAL    Review needed by CMO to determine Physician of Record No        10/08/22 1410                Diet Order   Procedures   • Diet Regular           Discharge instructions:  Follow up with your primary care provider in 1-2 weeks with a cbc and cmp         Total time spent discharging patient including evaluation, post hospitalization follow up,  medication and post hospitalization instructions and education, total time exceeds 30 minutes.    Signed:  Elias Villarreal MD  10/8/2022  14:15 EDT      Electronically signed by Elias Villarreal MD at 10/08/22 8298

## 2022-10-10 NOTE — CASE MANAGEMENT/SOCIAL WORK
Case Management Discharge Note      Final Note: Home on symbicort. Devika SLOAN/CCP    Provided Post Acute Provider List?: Yes  Post Acute Provider List: DME Supplier  Provided Post Acute Provider Quality & Resource List?: Refused    Selected Continued Care - Discharged on 10/8/2022 Admission date: 10/5/2022 - Discharge disposition: Home or Self Care    Destination    No services have been selected for the patient.              Durable Medical Equipment    No services have been selected for the patient.              Dialysis/Infusion    No services have been selected for the patient.              Home Medical Care    No services have been selected for the patient.              Therapy    No services have been selected for the patient.              Community Resources    No services have been selected for the patient.              Community & DME    No services have been selected for the patient.                  Transportation Services  Private: Car    Final Discharge Disposition Code: 01 - home or self-care

## 2022-10-11 ENCOUNTER — READMISSION MANAGEMENT (OUTPATIENT)
Dept: CALL CENTER | Facility: HOSPITAL | Age: 47
End: 2022-10-11

## 2022-10-11 NOTE — OUTREACH NOTE
COVID-19 Week 1 Survey    Flowsheet Row Responses   [facility] facility patient discharged from? [place]   Does the patient have one of the following disease processes/diagnoses(primary or secondary)? COVID-19   COVID-19 underlying condition? None   Call Number Call 2   Week 1 Call successful? No   Discharge diagnosis COVID-19 virus infection          KIM CHRISTINE - Registered Nurse

## 2022-10-16 NOTE — PROGRESS NOTES
Enter Query Response Below      Query Response:       Yes         If applicable, please update the problem list.     Patient: Vishal Hair        : 1975  Account: [id number]           Admit Date: 10/4/2022        How to Respond to this query:       a. Click New Note     b. Answer query within the yellow box.                c. Update the Problem List, if applicable.      If you have any questions about this query contact me at: [email or web address]      Dr. Villarreal:     Patient admitted with COVID-19 virus infection. Pulmonary consultation note included acute hypoxic respiratory failure as a diagnosis. Patient's oxygen saturation decreased to 89% on room air while talking, and down to 83 to 85% on room air while walking per emergency room physician. Patient was noted to have respiratory distress on 10/06 and positive accessory muscle use on 10/06 and 10/07. Patient was treated with supplemental oxygen up to 8 liters high flow, albuterol nebulizer, IV Solu-Medrol, and duo-neb treatments.     After study, do you agree with the pulmonary physician's diagnosis of acute hypoxic respiratory failure?    Yes  No   Other (please specify)______________  Clinically indeterminable     By submitting this query, we are merely seeking further clarification of documentation to accurately reflect all conditions that you are monitoring, evaluating, treating or that extend the hospitalization or utilize additional resources of care. Please utilize your independent clinical judgment when addressing the question(s) above.     This query and your response, once completed, will be entered into the legal medical record.    Sincerely,  Ernesto WHITLOCK, RN, CCDS   Clinical Documentation Integrity Program

## 2022-10-18 ENCOUNTER — READMISSION MANAGEMENT (OUTPATIENT)
Dept: CALL CENTER | Facility: HOSPITAL | Age: 47
End: 2022-10-18

## 2022-10-18 NOTE — OUTREACH NOTE
COVID-19 Week 2 Survey    Flowsheet Row Responses   [facility] patient discharged from? [place]   Does the patient have one of the following disease processes/diagnoses(primary or secondary)? COVID-19   COVID-19 underlying condition? None   Call Number Call 1   COVID-19 Week 2: Call 1 attempt successful? No   Discharge diagnosis COVID-19 virus infection          AREN SUTTON - Registered Nurse

## 2022-10-25 ENCOUNTER — READMISSION MANAGEMENT (OUTPATIENT)
Dept: CALL CENTER | Facility: HOSPITAL | Age: 47
End: 2022-10-25

## 2022-10-25 NOTE — OUTREACH NOTE
COVID-19 Week 3 Survey    Flowsheet Row Responses   [facility] patient discharged from? [place]   Does the patient have one of the following disease processes/diagnoses(primary or secondary)? COVID-19   COVID-19 underlying condition? None   Call Number Call 1   COVID-19 Week 3: Call 1 attempt successful? No   Discharge diagnosis COVID-19 virus infection          AREN SUTTON - Registered Nurse

## 2025-06-20 PROCEDURE — 88305 TISSUE EXAM BY PATHOLOGIST: CPT | Performed by: OTOLARYNGOLOGY

## 2025-06-23 ENCOUNTER — LAB REQUISITION (OUTPATIENT)
Dept: LAB | Facility: HOSPITAL | Age: 50
End: 2025-06-23
Payer: COMMERCIAL

## 2025-06-23 DIAGNOSIS — J32.4 CHRONIC PANSINUSITIS: ICD-10-CM

## 2025-06-24 LAB
LAB AP CASE REPORT: NORMAL
PATH REPORT.FINAL DX SPEC: NORMAL
PATH REPORT.GROSS SPEC: NORMAL